# Patient Record
Sex: FEMALE | Race: WHITE | Employment: OTHER | URBAN - METROPOLITAN AREA
[De-identification: names, ages, dates, MRNs, and addresses within clinical notes are randomized per-mention and may not be internally consistent; named-entity substitution may affect disease eponyms.]

---

## 2017-01-13 PROBLEM — O99.820 GBS (GROUP B STREPTOCOCCUS CARRIER), +RV CULTURE, CURRENTLY PREGNANT: Status: ACTIVE | Noted: 2017-01-13

## 2017-01-27 NOTE — PROGRESS NOTES
Patient ID verified. Allergies, medical history, prenatal record and prior to admission medications verified. Pt instructed to be NPO after midnight. Pt instructed to call @ 0500 for time to arrive at hospital, come to entrance C and sign in at the registration desk on the 4th floor. Patient instructed to come to hospital sooner if SROM, labor, or concerning symptoms. Patient verbalized understanding. Questions encouraged and answered. Awaiting LI form to place orders.

## 2017-01-30 ENCOUNTER — ANESTHESIA (OUTPATIENT)
Dept: LABOR AND DELIVERY | Age: 32
End: 2017-01-30
Payer: SELF-PAY

## 2017-01-30 ENCOUNTER — HOSPITAL ENCOUNTER (INPATIENT)
Age: 32
LOS: 1 days | Discharge: HOME | End: 2017-01-31
Attending: OBSTETRICS & GYNECOLOGY | Admitting: OBSTETRICS & GYNECOLOGY
Payer: SELF-PAY

## 2017-01-30 ENCOUNTER — ANESTHESIA EVENT (OUTPATIENT)
Dept: LABOR AND DELIVERY | Age: 32
End: 2017-01-30
Payer: SELF-PAY

## 2017-01-30 PROBLEM — Z37.9 NORMAL LABOR: Status: ACTIVE | Noted: 2017-01-30

## 2017-01-30 PROBLEM — Z3A.39 39 WEEKS GESTATION OF PREGNANCY: Status: ACTIVE | Noted: 2017-01-30

## 2017-01-30 LAB
ABO + RH BLD: NORMAL
BASE DEFICIT BLDCOA-SCNC: 6.4 MMOL/L (ref 0–2)
BASE DEFICIT BLDCOV-SCNC: 3.9 MMOL/L (ref 1.9–7.7)
BDY SITE: ABNORMAL
BDY SITE: ABNORMAL
BLOOD GROUP ANTIBODIES SERPL: NORMAL
ERYTHROCYTE [DISTWIDTH] IN BLOOD BY AUTOMATED COUNT: 12.9 % (ref 11.9–14.6)
HCO3 BLDCOA-SCNC: 23 MMOL/L (ref 22–26)
HCO3 BLDV-SCNC: 23 MMOL/L
HCT VFR BLD AUTO: 37.8 % (ref 35.8–46.3)
HGB BLD-MCNC: 11.9 G/DL (ref 11.7–15.4)
MCH RBC QN AUTO: 28.3 PG (ref 26.1–32.9)
MCHC RBC AUTO-ENTMCNC: 31.5 G/DL (ref 31.4–35)
MCV RBC AUTO: 90 FL (ref 79.6–97.8)
PCO2 BLDCOA: 61 MMHG (ref 33–49)
PCO2 BLDCOV: 48 MMHG (ref 14.1–43.3)
PH BLDCOA: 7.19 [PH] (ref 7.21–7.31)
PH BLDCOV: 7.3 [PH] (ref 7.2–7.44)
PLATELET # BLD AUTO: 182 K/UL (ref 150–450)
PMV BLD AUTO: 11.6 FL (ref 10.8–14.1)
PO2 BLDCOA: 20 MMHG (ref 9–19)
PO2 BLDV: 24 MMHG (ref 30.4–57.2)
RBC # BLD AUTO: 4.2 M/UL (ref 4.05–5.25)
SAO2 % BLDCOA: 40 %
SAO2 % BLDV: 61 %
SERVICE CMNT-IMP: ABNORMAL
SERVICE CMNT-IMP: ABNORMAL
SPECIMEN EXP DATE BLD: NORMAL
WBC # BLD AUTO: 9.8 K/UL (ref 4.3–11.1)

## 2017-01-30 PROCEDURE — 74011250636 HC RX REV CODE- 250/636

## 2017-01-30 PROCEDURE — 82803 BLOOD GASES ANY COMBINATION: CPT

## 2017-01-30 PROCEDURE — 75410000000 HC DELIVERY VAGINAL/SINGLE

## 2017-01-30 PROCEDURE — 77030014125 HC TY EPDRL BBMI -B: Performed by: ANESTHESIOLOGY

## 2017-01-30 PROCEDURE — 75410000002 HC LABOR FEE PER 1 HR

## 2017-01-30 PROCEDURE — 74011000258 HC RX REV CODE- 258: Performed by: OBSTETRICS & GYNECOLOGY

## 2017-01-30 PROCEDURE — 0UQMXZZ REPAIR VULVA, EXTERNAL APPROACH: ICD-10-PCS | Performed by: OBSTETRICS & GYNECOLOGY

## 2017-01-30 PROCEDURE — 74011250636 HC RX REV CODE- 250/636: Performed by: OBSTETRICS & GYNECOLOGY

## 2017-01-30 PROCEDURE — 65270000029 HC RM PRIVATE

## 2017-01-30 PROCEDURE — A4300 CATH IMPL VASC ACCESS PORTAL: HCPCS | Performed by: ANESTHESIOLOGY

## 2017-01-30 PROCEDURE — 86900 BLOOD TYPING SEROLOGIC ABO: CPT | Performed by: OBSTETRICS & GYNECOLOGY

## 2017-01-30 PROCEDURE — 77030018846 HC SOL IRR STRL H20 ICUM -A

## 2017-01-30 PROCEDURE — 3E033VJ INTRODUCTION OF OTHER HORMONE INTO PERIPHERAL VEIN, PERCUTANEOUS APPROACH: ICD-10-PCS | Performed by: OBSTETRICS & GYNECOLOGY

## 2017-01-30 PROCEDURE — 77030011945 HC CATH URIN INT ST MENT -A

## 2017-01-30 PROCEDURE — 74011258636 HC RX REV CODE- 258/636: Performed by: OBSTETRICS & GYNECOLOGY

## 2017-01-30 PROCEDURE — 77030011943

## 2017-01-30 PROCEDURE — 10907ZC DRAINAGE OF AMNIOTIC FLUID, THERAPEUTIC FROM PRODUCTS OF CONCEPTION, VIA NATURAL OR ARTIFICIAL OPENING: ICD-10-PCS | Performed by: OBSTETRICS & GYNECOLOGY

## 2017-01-30 PROCEDURE — 74011250637 HC RX REV CODE- 250/637: Performed by: OBSTETRICS & GYNECOLOGY

## 2017-01-30 PROCEDURE — 4A1HXCZ MONITORING OF PRODUCTS OF CONCEPTION, CARDIAC RATE, EXTERNAL APPROACH: ICD-10-PCS | Performed by: OBSTETRICS & GYNECOLOGY

## 2017-01-30 PROCEDURE — 75410000003 HC RECOV DEL/VAG/CSECN EA 0.5 HR

## 2017-01-30 PROCEDURE — 85027 COMPLETE CBC AUTOMATED: CPT | Performed by: OBSTETRICS & GYNECOLOGY

## 2017-01-30 PROCEDURE — 76060000078 HC EPIDURAL ANESTHESIA

## 2017-01-30 PROCEDURE — 77030003028 HC SUT VCRL J&J -A

## 2017-01-30 RX ORDER — MINERAL OIL
120 OIL (ML) ORAL AS NEEDED
Status: DISCONTINUED | OUTPATIENT
Start: 2017-01-30 | End: 2017-01-30

## 2017-01-30 RX ORDER — BUTORPHANOL TARTRATE 1 MG/ML
1 INJECTION INTRAMUSCULAR; INTRAVENOUS
Status: DISCONTINUED | OUTPATIENT
Start: 2017-01-30 | End: 2017-01-30 | Stop reason: HOSPADM

## 2017-01-30 RX ORDER — HYDROCODONE BITARTRATE AND ACETAMINOPHEN 5; 325 MG/1; MG/1
1 TABLET ORAL
Status: DISCONTINUED | OUTPATIENT
Start: 2017-01-30 | End: 2017-01-31 | Stop reason: HOSPADM

## 2017-01-30 RX ORDER — IBUPROFEN 800 MG/1
800 TABLET ORAL
Status: DISCONTINUED | OUTPATIENT
Start: 2017-01-30 | End: 2017-01-31 | Stop reason: HOSPADM

## 2017-01-30 RX ORDER — CEFAZOLIN SODIUM IN 0.9 % NACL 2 G/50 ML
2 INTRAVENOUS SOLUTION, PIGGYBACK (ML) INTRAVENOUS ONCE
Status: COMPLETED | OUTPATIENT
Start: 2017-01-30 | End: 2017-01-30

## 2017-01-30 RX ORDER — SODIUM CHLORIDE 0.9 % (FLUSH) 0.9 %
5-10 SYRINGE (ML) INJECTION EVERY 8 HOURS
Status: DISCONTINUED | OUTPATIENT
Start: 2017-01-30 | End: 2017-01-30

## 2017-01-30 RX ORDER — ROPIVACAINE HYDROCHLORIDE 2 MG/ML
INJECTION, SOLUTION EPIDURAL; INFILTRATION; PERINEURAL
Status: DISCONTINUED | OUTPATIENT
Start: 2017-01-30 | End: 2017-01-30 | Stop reason: HOSPADM

## 2017-01-30 RX ORDER — MINERAL OIL
120 OIL (ML) ORAL
Status: COMPLETED | OUTPATIENT
Start: 2017-01-30 | End: 2017-01-30

## 2017-01-30 RX ORDER — LIDOCAINE HYDROCHLORIDE 20 MG/ML
JELLY TOPICAL
Status: DISCONTINUED | OUTPATIENT
Start: 2017-01-30 | End: 2017-01-30 | Stop reason: HOSPADM

## 2017-01-30 RX ORDER — OXYTOCIN/RINGER'S LACTATE 15/250 ML
250 PLASTIC BAG, INJECTION (ML) INTRAVENOUS ONCE
Status: DISCONTINUED | OUTPATIENT
Start: 2017-01-30 | End: 2017-01-30

## 2017-01-30 RX ORDER — DEXTROSE, SODIUM CHLORIDE, SODIUM LACTATE, POTASSIUM CHLORIDE, AND CALCIUM CHLORIDE 5; .6; .31; .03; .02 G/100ML; G/100ML; G/100ML; G/100ML; G/100ML
125 INJECTION, SOLUTION INTRAVENOUS CONTINUOUS
Status: DISCONTINUED | OUTPATIENT
Start: 2017-01-30 | End: 2017-01-30

## 2017-01-30 RX ORDER — OXYTOCIN/RINGER'S LACTATE 15/250 ML
250 PLASTIC BAG, INJECTION (ML) INTRAVENOUS ONCE
Status: ACTIVE | OUTPATIENT
Start: 2017-01-30 | End: 2017-01-31

## 2017-01-30 RX ORDER — DIPHENHYDRAMINE HCL 25 MG
25 CAPSULE ORAL
Status: DISCONTINUED | OUTPATIENT
Start: 2017-01-30 | End: 2017-01-31 | Stop reason: HOSPADM

## 2017-01-30 RX ORDER — SIMETHICONE 80 MG
80 TABLET,CHEWABLE ORAL
Status: DISCONTINUED | OUTPATIENT
Start: 2017-01-30 | End: 2017-01-31 | Stop reason: HOSPADM

## 2017-01-30 RX ORDER — ZOLPIDEM TARTRATE 5 MG/1
5 TABLET ORAL
Status: DISCONTINUED | OUTPATIENT
Start: 2017-01-30 | End: 2017-01-31 | Stop reason: HOSPADM

## 2017-01-30 RX ORDER — LIDOCAINE HYDROCHLORIDE 10 MG/ML
1 INJECTION INFILTRATION; PERINEURAL
Status: DISCONTINUED | OUTPATIENT
Start: 2017-01-30 | End: 2017-01-30 | Stop reason: HOSPADM

## 2017-01-30 RX ORDER — DIPHENHYDRAMINE HCL 25 MG
50 CAPSULE ORAL
Status: DISCONTINUED | OUTPATIENT
Start: 2017-01-30 | End: 2017-01-31 | Stop reason: HOSPADM

## 2017-01-30 RX ORDER — MINERAL OIL
OIL (ML) ORAL ONCE
Status: COMPLETED | OUTPATIENT
Start: 2017-01-30 | End: 2017-01-30

## 2017-01-30 RX ORDER — FENTANYL CITRATE 50 UG/ML
INJECTION, SOLUTION INTRAMUSCULAR; INTRAVENOUS AS NEEDED
Status: DISCONTINUED | OUTPATIENT
Start: 2017-01-30 | End: 2017-01-30 | Stop reason: HOSPADM

## 2017-01-30 RX ORDER — NALOXONE HYDROCHLORIDE 0.4 MG/ML
0.4 INJECTION, SOLUTION INTRAMUSCULAR; INTRAVENOUS; SUBCUTANEOUS AS NEEDED
Status: DISCONTINUED | OUTPATIENT
Start: 2017-01-30 | End: 2017-01-31 | Stop reason: HOSPADM

## 2017-01-30 RX ORDER — HYDROCODONE BITARTRATE AND ACETAMINOPHEN 5; 325 MG/1; MG/1
2 TABLET ORAL
Status: DISCONTINUED | OUTPATIENT
Start: 2017-01-30 | End: 2017-01-31 | Stop reason: HOSPADM

## 2017-01-30 RX ORDER — SODIUM CHLORIDE 0.9 % (FLUSH) 0.9 %
5-10 SYRINGE (ML) INJECTION AS NEEDED
Status: DISCONTINUED | OUTPATIENT
Start: 2017-01-30 | End: 2017-01-30

## 2017-01-30 RX ORDER — OXYTOCIN/RINGER'S LACTATE 30/500 ML
.5-2 PLASTIC BAG, INJECTION (ML) INTRAVENOUS
Status: DISCONTINUED | OUTPATIENT
Start: 2017-01-30 | End: 2017-01-30 | Stop reason: HOSPADM

## 2017-01-30 RX ADMIN — WITCH HAZEL 1 PAD: 500 SOLUTION RECTAL; TOPICAL at 20:35

## 2017-01-30 RX ADMIN — CEFAZOLIN 2 G: 1 INJECTION, POWDER, FOR SOLUTION INTRAMUSCULAR; INTRAVENOUS; PARENTERAL at 07:45

## 2017-01-30 RX ADMIN — SODIUM CHLORIDE, SODIUM LACTATE, POTASSIUM CHLORIDE, CALCIUM CHLORIDE, AND DEXTROSE MONOHYDRATE 125 ML/HR: 600; 310; 30; 20; 5 INJECTION, SOLUTION INTRAVENOUS at 07:30

## 2017-01-30 RX ADMIN — MINERAL OIL 120 ML: 471.95 OIL ORAL at 15:55

## 2017-01-30 RX ADMIN — ROPIVACAINE HYDROCHLORIDE 10 ML/HR: 2 INJECTION, SOLUTION EPIDURAL; INFILTRATION; PERINEURAL at 12:03

## 2017-01-30 RX ADMIN — SODIUM CHLORIDE, SODIUM LACTATE, POTASSIUM CHLORIDE, AND CALCIUM CHLORIDE 1000 ML: 600; 310; 30; 20 INJECTION, SOLUTION INTRAVENOUS at 11:29

## 2017-01-30 RX ADMIN — OXYTOCIN 2 MILLI-UNITS/MIN: 10 INJECTION, SOLUTION INTRAMUSCULAR; INTRAVENOUS at 07:50

## 2017-01-30 RX ADMIN — FENTANYL CITRATE 100 MCG: 50 INJECTION, SOLUTION INTRAMUSCULAR; INTRAVENOUS at 15:55

## 2017-01-30 RX ADMIN — BENZOCAINE AND MENTHOL 1 SPRAY: 20; .5 SPRAY TOPICAL at 20:35

## 2017-01-30 RX ADMIN — MINERAL OIL: 471.95 OIL ORAL at 16:50

## 2017-01-30 RX ADMIN — SODIUM CHLORIDE 1 G: 900 INJECTION, SOLUTION INTRAVENOUS at 15:25

## 2017-01-30 RX ADMIN — MINERAL OIL 120 ML: 471.95 OIL ORAL at 17:07

## 2017-01-30 NOTE — PROGRESS NOTES
Patient starting to feel relief from epidural, feeling a little pain in right lower abdomen. Repositioned to right tilt, will continue to monitor    Dr Maura Brownlee called unit for update, patient has epidural, AROM @ 1040 SVE at that time 4cm.   Orders received to call her when patient is closer to delivery

## 2017-01-30 NOTE — IP AVS SNAPSHOT
Current Discharge Medication List  
  
Take these medications as needed Dose & Instructions Dispensing Information Comments Morning Noon Evening Bedtime  
 benzocaine-menthol 20-0.5 % Aero Commonly known as:  DERMOPLAST Your next dose is: Today, Tomorrow Other:  ____________ Dose:  1 Spray Apply 1 Spray to affected area as needed for Pain. Quantity:  1 mL Refills:  3 HYDROcodone-acetaminophen 5-325 mg per tablet Commonly known as:  Viva Ignacio Your next dose is: Today, Tomorrow Other:  ____________ Dose:  1-2 Tab Take 1-2 Tabs by mouth every six (6) hours as needed. Max Daily Amount: 8 Tabs. Quantity:  30 Tab Refills:  0  
     
   
   
   
  
 ibuprofen 800 mg tablet Commonly known as:  MOTRIN Your next dose is: Today, Tomorrow Other:  ____________ Dose:  800 mg Take 1 Tab by mouth every eight (8) hours as needed. Quantity:  90 Tab Refills:  0 Take these medications as directed Dose & Instructions Dispensing Information Comments Morning Noon Evening Bedtime  
 prenatal no. 40-iron-FA-dha 27mg iron- 800 mcg-250 mg Cap Your next dose is: Today, Tomorrow Other:  ____________ Take  by mouth. OTC Gummy Refills:  0 Where to Get Your Medications These medications were sent to [address] [place] [address]  [address] [place] [address] Phone:  [phone]  
  benzocaine-menthol 20-0.5 % Aero  
 ibuprofen 800 mg tablet Information about where to get these medications is not yet available ! Ask your nurse or doctor about these medications HYDROcodone-acetaminophen 5-325 mg per tablet

## 2017-01-30 NOTE — PROGRESS NOTES
01/30/17 1040   Membranes   Membrane Status AROM   Rupture Date 01/30/17   Rupture Time 1040   Amniotic Fluid Description Clear   Amniotic Fluid Volume  Moderate   Cervical Exam   Dilation (cm) 4  (per Dr tidwell)   Dr Frank Fox at , strip reviewed.   Patient may have epidural when desires

## 2017-01-30 NOTE — L&D DELIVERY NOTE
Delivery Summary    Patient: Dale Zepeda MRN: [id number]  SSN: [id number]    YOB: 1985  Age: 33 y.o. Sex: female       Information for the patient's :  Paula Gallagher [[id number]]       Labor Events:    Labor: No   Rupture Date:     Rupture Time:     Rupture Type AROM   Amniotic Fluid Volume: Moderate    Amniotic Fluid Description: Clear     Induction: AROM; Oxytocin       Augmentation: None   Labor Events: None     Cervical Ripening:     None     Delivery Events:  Episiotomy: Midline   Laceration(s): Left periurethral     Repaired: Yes    Number of Repair Packets: 1   Suture Type and Size: Vicryl 3-0     Estimated Blood Loss (ml): 350ml       Delivery Date: 2017    Delivery Time: 5:29 PM  Delivery Type: Vaginal, Spontaneous Delivery  Sex:  Female     Gestational Age: 36w3d   Delivery Clinician:  Lyudmila Case  Living Status: Yes   Delivery Location: L&D            APGARS  One minute Five minutes Ten minutes   Skin color: 0   1        Heart rate: 2   2        Grimace: 1   2        Muscle tone: 2   2        Breathin   2        Totals: 7   9            Presentation: Vertex    Position: Left Occiput Anterior  Resuscitation Method:  Tactile Stimulation;Suctioning-bulb     Meconium Stained: None      Cord Vessels: 3 Vessels      Cord Events:    Cord Blood Sent?:  Yes    Blood Gases Sent?:  Yes    Placenta:  Date/Time:   5:34 PM  Removal: Spontaneous      Appearance: Normal;Intact     [place] Measurements:  Birth Weight: 7 lb 13.9 oz (3.57 kg)      Birth Length: 56 cm      Head Circumference: 33 cm      Chest Circumference: 34 cm     Abdominal Girth:       Other Providers:   Chang HARLEY;BREANA JOHNSON;VIJI COLEMAN;DISHA HOGAN, Obstetrician;Primary Nurse;Primary [place] Nurse;Charge Nurse           Group B Strep:   Lab Results   Component Value Date/Time    GrBStrep, External Positive 2017     Information for the patient's :  Brie Pia Hanh [[id number]]   No results found for: ABORH, PCTABR, PCTDIG, BILI, ABORHEXT, ABORH    Lab Results   Component Value Date/Time    APH 7.192 (L) 01/30/2017 05:29 PM    APCO2 61 (H) 01/30/2017 05:29 PM    APO2 20 (H) 01/30/2017 05:29 PM    AHCO3 23 01/30/2017 05:29 PM    ABDC 6.4 (H) 01/30/2017 05:29 PM    EPHV 7.298 01/30/2017 05:29 PM    PCO2V 48 (H) 01/30/2017 05:29 PM    PO2V 24 (L) 01/30/2017 05:29 PM    HCO3V 23 01/30/2017 05:29 PM    EBDV 3.9 01/30/2017 05:29 PM    SITE CORD 01/30/2017 05:29 PM    SITE CORD 01/30/2017 05:29 PM    RSCOM cc at 1 30 2017 5 42 40 PM. Not read back. 01/30/2017 05:29 PM    RSCOM cc at 1 30 2017 5 45 11 PM. Not read back. 01/30/2017 05:29 PM      Delivery Note        Lab Results   Component Value Date/Time    PH,CORD BLD ARTERIAL 7.192 01/30/2017 05:29 PM    PCO2,CORD BLD ARTERIAL 61 01/30/2017 05:29 PM    PO2,CORD BLD ARTERIAL 20 01/30/2017 05:29 PM    HCO3,CORD BLD ARTERIAL 23 01/30/2017 05:29 PM    BASE DEFICIT,CBA 6.4 01/30/2017 05:29 PM    SITE CORD 01/30/2017 05:29 PM    SITE CORD 01/30/2017 05:29 PM    Respiratory comment: cc at 1 30 2017 5 42 40 PM. Not read back. 01/30/2017 05:29 PM    Respiratory comment: cc at 1 30 2017 5 45 11 PM. Not read back. 01/30/2017 05:29 PM            Lab Results   Component Value Date/Time    Rubella, External Non-Immune 07/20/2016    GrBStrep, External Positive 01/09/2017            Procedure:  Patient became completely dilated and pushed. Episiotomy was performed because she couldn't push past 4+ station for over 30 minutes. Patient delivered head. Mouth and nares suctioned on the perineum with bulb syringe. Posterior shoulder delivered first since it was a hand shoulder presentation. Baby delivered in the bed, was dried. The cord was clamped and cut. Cord pH and cord blood was obtained. The baby was placed on mom in a dry blanket or towel. The perineum was examined. The MLE did not extend and was repaired with 3.0 vicryl. Emelyn Krishnamurthyuss She also had a small left periurethral that did not require a suture. The placenta was delivered spontaneously. It was examined. It was intact with three vessels. Mom and baby are doing well.          Penelope Butler MD  1/30/2017  6:07 PM

## 2017-01-30 NOTE — H&P
History & Physical    Name: Dilia Morrow MRN: [id number]  SSN: [id number]    YOB: 1985  Age: 33 y.o. Sex: female      Subjective:     Estimated Date of Delivery: 17  OB History    Para Term  AB SAB TAB Ectopic Multiple Living   2 1 1       1      # Outcome Date GA Lbr Silver/2nd Weight Sex Delivery Anes PTL Lv   2 Current            1 Term 11 40w0d  8 lb 0.8 oz (3.65 kg) babbel       Ms. Ruslan Patel is admitted with pregnancy at 39w1d for induction of labor with Dr. Shruti Urrutia due to favorable cervix at term in a multigravida, GBS+. Prenatal course was normal.  Please see prenatal records for details. Past Medical History   Diagnosis Date    Abnormal Papanicolaou smear of cervix      HPV negative    Hx of postpartum depression, currently pregnant      no meds    Postpartum depression      Past Surgical History   Procedure Laterality Date    Hx breast biopsy Left 2011     papilloma - 3:00; Dr. Shashi Silver Hx knee arthroscopy Left     Hx breast biopsy Left 2012     needle biopsy    Hx skin biopsy Left      removal of dermatofibroma left inner thigh    Hx wisdom teeth extraction       Social History     Occupational History    Interior Design Self Employed     Social History Main Topics    Smoking status: Never Smoker    Smokeless tobacco: Never Used    Alcohol use No    Drug use: No    Sexual activity: Yes     Partners: Male     Birth control/ protection: None     Family History   Problem Relation Age of Onset    Hypertension Paternal Grandmother     Heart Disease Paternal Grandmother     Cancer Maternal Grandmother [de-identified]     Lung       Allergies   Allergen Reactions    Gluten Diarrhea     Achy, digestive issues, acne    Peanut Swelling    Penicillin G Hives     Prior to Admission medications    Medication Sig Start Date End Date Taking? Authorizing Provider   prenatal no. 40-iron-FA-dha 27mg iron- 800 mcg-250 mg cap Take  by mouth. OTC Gummy   Yes Historical Provider        Review of Systems: A comprehensive review of systems was negative except for that written in the History of Present Illness. Objective:     Vitals:  Vitals:    01/30/17 0923 01/30/17 0952 01/30/17 1023 01/30/17 1052   BP: 100/62 106/67 100/58 104/69   Pulse: 79 83 75 88   Resp:       Temp:       Weight:       Height:            Physical Exam:  Patient without distress. Heart: Regular rate  Lung: normal respiratory effort  Abdomen: soft, nontender, gravid  Fundus: soft and non tender  Perineum: blood absent, amniotic fluid absent  Cervical Exam: 4 cm dilated    Lower Extremities:  - Edema No  Membranes:  Artificial Rupture of Membranes; Amniotic Fluid: small amount of clear fluid  Fetal Heart Rate: Reactive  Variability: moderate  Accelerations: yes  Decelerations: none  Uterine contractions: q 2-3 min on toco          Prenatal Labs:   Lab Results   Component Value Date/Time    ABO/Rh(D) O POSITIVE 01/30/2017 07:30 AM    Rubella, External Non-Immune 07/20/2016    HBsAg, External Negative 07/20/2016    HIV, External Non-Reactive  07/20/2016    RPR, External Non-Reactive  07/20/2016    Gonorrhea, External Negative 08/01/2016    Chlamydia, External Negative  08/01/2016    ABO,Rh O Positive  07/20/2016    GrBStrep, External Positive 01/09/2017       Impression/Plan:     Principal Problem:    Normal labor (1/30/2017)    Active Problems:    Supervision of normal intrauterine pregnancy in multigravida (10/17/2016)      Overview: Cara Meredith            Problems:            1. Prior VAVD (2011) with midline episiotomy / partial 3rd degree per       ELVA's delivery note. Pt prefers IOL at 39-40wks due to this and states       this was ELVA's recommendation to her after the delivery. Birthweight was       3650 grams which is 52%ile. She pushed for 2 hours and was +2 station.       2.  GBS + -- treat in labor with Ancef      GBS (group B Streptococcus carrier), +RV culture, currently pregnant (1/13/2017)      39 weeks gestation of pregnancy (1/30/2017)         Plan: Admit for induction of labor. Group B Strep positive, will treat prophylactically with Ancef due to non-anaphylactic reaction to PCN. Dr. Akhil Mcdonough would like to be notified for delivery if possible.      Signed By:  Pilar Blair MD     January 30, 2017

## 2017-01-30 NOTE — IP AVS SNAPSHOT
Irma Idol 
 
 
 [address] [id number] [facility] 
[phone] Patient: Maribell Fenton MRN: [id number] RSO:[id number] You are allergic to the following Allergen Reactions Gluten Diarrhea Achy, digestive issues, acne Peanut Swelling Penicillin G Hives Immunizations Administered for This Admission Name Date MMR 1/31/2017 Recent Documentation Height Weight Breastfeeding? BMI OB Status Smoking Status 1.727 m 72.1 kg Yes 24.18 kg/m2 Recent pregnancy Never Smoker Emergency Contacts Name Discharge Info Relation Home Work Mobile Ronald Watson  Spouse [3] [phone] About your hospitalization You were admitted on:  January 30, 2017 You last received care in the:  [address] [facility] You were discharged on:  January 31, 2017 Unit phone number:  [phone] Why you were hospitalized Your primary diagnosis was:  Normal Labor Your diagnoses also included:  39 Weeks Gestation Of Pregnancy, Gbs (Group B Streptococcus Carrier), +Rv Culture, Currently Pregnant, Supervision Of Normal Intrauterine Pregnancy In Multigravida Providers Seen During Your Hospitalizations Provider Role Specialty Primary office phone Indra Williamson MD Attending Provider Obstetrics & Gynecology [phone] Alyssa Puente MD Attending Provider Obstetrics & Gynecology [phone] Your Primary Care Physician (PCP) Primary Care Physician Office Phone Office Fax UNKNOWN, PROVIDER ** None ** ** None ** Follow-up Information Follow up With Details Comments Contact Info Provider Unknown   Patient not available to ask Zehra Zafar MD In 6 weeks  [address] [address] [phone] Current Discharge Medication List  
  
START taking these medications Dose & Instructions Dispensing Information Comments Morning Noon Evening Bedtime  
 benzocaine-menthol 20-0.5 % Aero Commonly known as:  DERMOPLAST Your next dose is: Today, Tomorrow Other:  _________ Dose:  1 Spray Apply 1 Spray to affected area as needed for Pain. Quantity:  1 mL Refills:  3 HYDROcodone-acetaminophen 5-325 mg per tablet Commonly known as:  Juanetta Rasp Your next dose is: Today, Tomorrow Other:  _________ Dose:  1-2 Tab Take 1-2 Tabs by mouth every six (6) hours as needed. Max Daily Amount: 8 Tabs. Quantity:  30 Tab Refills:  0  
     
   
   
   
  
 ibuprofen 800 mg tablet Commonly known as:  MOTRIN Your next dose is: Today, Tomorrow Other:  _________ Dose:  800 mg Take 1 Tab by mouth every eight (8) hours as needed. Quantity:  90 Tab Refills:  0 CONTINUE these medications which have NOT CHANGED Dose & Instructions Dispensing Information Comments Morning Noon Evening Bedtime  
 prenatal no. 40-iron-FA-dha 27mg iron- 800 mcg-250 mg Cap Your next dose is: Today, Tomorrow Other:  _________ Take  by mouth. OTC Gummy Refills:  0 Where to Get Your Medications These medications were sent to [place] [address] Phone:  [phone]  
  benzocaine-menthol 20-0.5 % Aero  
 ibuprofen 800 mg tablet Information on where to get these meds will be given to you by the nurse or doctor. ! Ask your nurse or doctor about these medications HYDROcodone-acetaminophen 5-325 mg per tablet Discharge Instructions Vaginal Childbirth: Care Instructions Your Care Instructions Your body will slowly heal in the next few weeks. It is easy to get too tired and overwhelmed during the first weeks after your baby is born. Changes in your hormones can shift your mood without warning. You may find it hard to meet the extra demands on your energy and time. Take it easy on yourself. Follow-up care is a key part of your treatment and safety. Be sure to make and go to all appointments, and call your doctor if you are having problems. It's also a good idea to know your test results and keep a list of the medicines you take. How can you care for yourself at home? · Vaginal bleeding and cramps ¨ After delivery, you will have a bloody discharge from the vagina. This will turn pink within a week and then white or yellow after about 10 days. It may last for 2 to 4 weeks or longer, until the uterus has healed. Use pads instead of tampons until you stop bleeding. ¨ Do not worry if you pass some blood clots, as long as they are smaller than a golf ball. If you have a tear or stitches in your vaginal area, change the pad at least every 4 hours to prevent soreness and infection. ¨ You may have cramps for the first few days after childbirth. These are normal and occur as the uterus shrinks to normal size. Take an over-the-counter pain medicine, such as acetaminophen (Tylenol), ibuprofen (Advil, Motrin), or naproxen (Aleve), for cramps. Read and follow all instructions on the label. Do not take aspirin, because it can cause more bleeding. ¨ Do not take two or more pain medicines at the same time unless the doctor told you to. Many pain medicines have acetaminophen, which is Tylenol. Too much acetaminophen (Tylenol) can be harmful. · Stitches ¨ If you have stitches, they will dissolve on their own and do not need to be removed. Follow your doctor's instructions for cleaning the stitched area. ¨ Put ice or a cold pack on your painful area for 10 to 20 minutes at a time, several times a day, for the first few days. Put a thin cloth between the ice and your skin.  
¨ Sit in a few inches of warm water (sitz bath) 3 times a day and after bowel movements. The warm water helps with pain and itching. If you do not have a tub, a warm shower might help. · Breast fullness ¨ Your breasts may overfill (engorge) in the first few days after delivery. To help milk flow and to relieve pain, warm your breasts in the shower or by using warm, moist towels before nursing. ¨ If you are not nursing, do not put warmth on your breasts or touch your breasts. Wear a tight bra or sports bra and use ice until the fullness goes away. This usually takes 2 to 3 days. ¨ Put ice or a cold pack on your breast after nursing to reduce swelling and pain. Put a thin cloth between the ice and your skin. · Activity ¨ Eat a balanced diet. Do not try to lose weight by cutting calories. Keep taking your prenatal vitamins, or take a multivitamin. ¨ Get as much rest as you can. Try to take naps when your baby sleeps during the day. ¨ Get some exercise every day. But do not do any heavy exercise until your doctor says it is okay. ¨ Wait until you are healed (about 4 to 6 weeks) before you have sexual intercourse. Your doctor will tell you when it is okay to have sex. ¨ Talk to your doctor about birth control. You can get pregnant even before your period returns. Also, you can get pregnant while you are breastfeeding. · Mental health ¨ It is normal to have some sadness, anxiety, sleeplessness, and mood swings after you go home. If you feel upset or hopeless for more than a few days or are having trouble doing the things you need to do, talk to your doctor. · Constipation and hemorrhoids ¨ Drink plenty of fluids, enough so that your urine is light yellow or clear like water. If you have kidney, heart, or liver disease and have to limit fluids, talk with your doctor before you increase the amount of fluids you drink. ¨ Eat plenty of fiber each day. Have a bran muffin or bran cereal for breakfast, and try eating a piece of fruit for a mid-afternoon snack. ¨ For painful, itchy hemorrhoids, put ice or a cold pack on the area several times a day for 10 minutes at a time. Follow this by putting a warm compress on the area for another 10 to 20 minutes or by sitting in a shallow, warm bath. When should you call for help? Call 911 anytime you think you may need emergency care. For example, call if: 
· You are thinking of hurting yourself, your baby, or anyone else. · You have sudden, severe pain in your belly. · You passed out (lost consciousness). Call your doctor now or seek immediate medical care if: 
· You have severe vaginal bleeding. · You are soaking through a pad each hour for 2 or more hours. · Your vaginal bleeding seems to be getting heavier or is still bright red 4 days after delivery. · You are dizzy or lightheaded, or you feel like you may faint. · You are vomiting or cannot keep fluids down. · You have a fever. · You have new or more belly pain. · You pass tissue (not just blood). · Your vaginal discharge smells bad. · Your belly feels tender or full and hard. · Your breasts are continuously painful or red. · You feel sad, anxious, or hopeless for more than a few days. Watch closely for changes in your health, and be sure to contact your doctor if you have any problems. Where can you learn more? Go to http://blaine-rena.info/. Enter J209 in the search box to learn more about \"Vaginal Childbirth: Care Instructions. \" Current as of: May 30, 2016 Content Version: 11.1 © 5878-5382 [employer]. Care instructions adapted under license by [employer] (which disclaims liability or warranty for this information). If you have questions about a medical condition or this instruction, always ask your healthcare professional. [place] [address] any warranty or liability for your use of this information. Discharge Orders None [facility] Announcement We are excited to announce that we are making your provider's discharge notes available to you in [employer]. You will see these notes when they are completed and signed by the physician that discharged you from your recent hospital stay. If you have any questions or concerns about any information you see in [employer], please call the Health Information Department where you were seen or reach out to your Primary Care Provider for more information about your plan of care. Introducing [facility]! [employer] introduces [employer] patient portal. Now you can access parts of your medical record, email your doctor's office, and request medication refills online. 1. In your internet browser, go to [email or web address] 2. Click on the First Time User? Click Here link in the Sign In box. You will see the New Member Sign Up page. 3. Enter your [employer] Access Code exactly as it appears below. You will not need to use this code after youve completed the sign-up process. If you do not sign up before the expiration date, you must request a new code. · [employer] Access Code: [facility] Expires: 4/13/2017 10:07 AM 
 
4. Enter the last four digits of your Social Security Number (xxxx) and Date of Birth (mm/dd/yyyy) as indicated and click Submit. You will be taken to the next sign-up page. 5. Create a [employer] ID. This will be your [employer] login ID and cannot be changed, so think of one that is secure and easy to remember. 6. Create a [employer] password. You can change your password at any time. 7. Enter your Password Reset Question and Answer. This can be used at a later time if you forget your password. 8. Enter your e-mail address. You will receive e-mail notification when new information is available in [address] 9. Click Sign Up. You can now view and download portions of your medical record.  
10. Click the Download Summary menu link to download a portable copy of your medical information. If you have questions, please visit the Frequently Asked Questions section of the MyChart website. Remember, MyChart is NOT to be used for urgent needs. For medical emergencies, dial 911. Now available from your iPhone and Android! General Information Please provide this summary of care documentation to your next provider. Patient Signature:  ____________________________________________________________ Date:  ____________________________________________________________  
  
Charlotte Stevens Provider Signature:  ____________________________________________________________ Date:  ____________________________________________________________

## 2017-01-30 NOTE — PROGRESS NOTES
01/30/17 1305   Straight Cath   Straight Cath Nurse performed cath   Number of Attempts 1   Catheter Size 16 FR   Time Catheter Inserted 2862   Time Catheter Removed 1308   Urine 450 mL   Urine Assessment   Urinary Status Straight cath   Urine Color Yellow/straw   Urine Appearance Clear   Place on peanut on right side

## 2017-01-30 NOTE — PROGRESS NOTES
Pt admitted to Room 433 for scheduled induction. Admission assessment completed. Discussed plan of care with patient. Discussed pt's choice of infant feeding method. Feeding options explored, including the importance of exclusive breastfeeding. Pt informed of our breastfeeding support system from from nursing staff and lactation staff during inpatient stay and following discharge. Questions and concerns addressed at this time. Pt confirms breastfeeding is her decision at this time.

## 2017-01-30 NOTE — PROGRESS NOTES
Ultrasound adjusted, patient continues to feel contractions in right lower abdomen.   Remains in right tilt

## 2017-01-30 NOTE — PROGRESS NOTES
Olivia Araiza at bedside at 1140     Assisted pt to sitting up on bedside at 1141. Timeout completed at [phone] with MD, GUNNAR and myself at bedside. Test dose given [id number] . Negative reaction. See anesthesia record for details. See vital sign flow sheet for BP. Tolerated procedure well.

## 2017-01-30 NOTE — PROGRESS NOTES
01/30/17 0741   Peripheral IV 01/30/17 Left Wrist   Placement Date/Time: 01/30/17 0730   Number of Attempts: 1  Inserted By: Pete Anaya  Present on Admission/Arrival: No  Size: 18 G  Orientation: Left  Location: Wrist   Site Assessment Clean, dry, & intact   Phlebitis Assessment 0   Infiltration Assessment 0   Dressing Status Clean, dry, & intact   Dressing Type Tape;Transparent   Hub Color/Line Status Green; Infusing   Action Taken Blood drawn

## 2017-01-30 NOTE — PROGRESS NOTES
01/30/17 1550   Cervical Exam   Dilation (cm) 10   Eff 100 %   Station +1   Dr Rasheed Torres called, MD to see two more patient's in office and will head this way    Patient continues to have pain in right lower abdomen, CRNA back to bs for more dosing

## 2017-01-30 NOTE — PROGRESS NOTES
Delivery Note      Pt positioned for delivery and set up at 1640. Spontaneous vaginal delivery of viable female infant @ 0435    [id number] 7 7. Perineum with second degree episiotomy and repaired. Placenta delivered @ [phone]    See delivery summary for details.

## 2017-01-30 NOTE — ANESTHESIA PREPROCEDURE EVALUATION
Anesthetic History               Review of Systems / Medical History  Patient summary reviewed and pertinent labs reviewed    Pulmonary                   Neuro/Psych              Cardiovascular                  Exercise tolerance: >4 METS     GI/Hepatic/Renal                Endo/Other             Other Findings              Physical Exam    Airway  Mallampati: I  TM Distance: > 6 cm  Neck ROM: normal range of motion   Mouth opening: Normal     Cardiovascular  Regular rate and rhythm,  S1 and S2 normal,  no murmur, click, rub, or gallop  Rhythm: regular  Rate: normal         Dental  No notable dental hx       Pulmonary  Breath sounds clear to auscultation               Abdominal         Other Findings            Anesthetic Plan    ASA: 1  Anesthesia type: epidural            Anesthetic plan and risks discussed with: Patient

## 2017-01-30 NOTE — PROGRESS NOTES
01/30/17 1505   Straight Cath   Straight Cath Nurse performed cath   Number of Attempts 1   Catheter Size 16 FR   Time Catheter Inserted 1505   Time Catheter Removed 1507   Urine 400 mL   Urine Assessment   Urinary Status Straight cath   Urine Color Yellow/straw   Urine Appearance Clear   Patient with c/o pain 4/10 in right lower abdomen. Repositioned to right side on peanut, epidural button dosed per patient.       Dr Jose Agosto notified of SVE, call MD when patient is 10 cm

## 2017-01-31 VITALS
TEMPERATURE: 97.9 F | DIASTOLIC BLOOD PRESSURE: 56 MMHG | BODY MASS INDEX: 24.1 KG/M2 | WEIGHT: 159 LBS | HEART RATE: 96 BPM | RESPIRATION RATE: 16 BRPM | HEIGHT: 68 IN | SYSTOLIC BLOOD PRESSURE: 106 MMHG

## 2017-01-31 PROCEDURE — 74011250636 HC RX REV CODE- 250/636: Performed by: OBSTETRICS & GYNECOLOGY

## 2017-01-31 PROCEDURE — 90707 MMR VACCINE SC: CPT | Performed by: OBSTETRICS & GYNECOLOGY

## 2017-01-31 RX ORDER — IBUPROFEN 800 MG/1
800 TABLET ORAL
Qty: 90 TAB | Refills: 0 | Status: SHIPPED | OUTPATIENT
Start: 2017-01-31 | End: 2017-03-16

## 2017-01-31 RX ORDER — HYDROCODONE BITARTRATE AND ACETAMINOPHEN 5; 325 MG/1; MG/1
1-2 TABLET ORAL
Qty: 30 TAB | Refills: 0 | Status: SHIPPED | OUTPATIENT
Start: 2017-01-31 | End: 2017-03-16

## 2017-01-31 RX ADMIN — MEASLES, MUMPS, AND RUBELLA VIRUS VACCINE LIVE 0.5 ML: 1000; 12500; 1000 INJECTION, POWDER, LYOPHILIZED, FOR SUSPENSION SUBCUTANEOUS at 17:30

## 2017-01-31 NOTE — DISCHARGE INSTRUCTIONS
Vaginal Childbirth: Care Instructions  Your Care Instructions  Your body will slowly heal in the next few weeks. It is easy to get too tired and overwhelmed during the first weeks after your baby is born. Changes in your hormones can shift your mood without warning. You may find it hard to meet the extra demands on your energy and time. Take it easy on yourself. Follow-up care is a key part of your treatment and safety. Be sure to make and go to all appointments, and call your doctor if you are having problems. It's also a good idea to know your test results and keep a list of the medicines you take. How can you care for yourself at home? · Vaginal bleeding and cramps  ¨ After delivery, you will have a bloody discharge from the vagina. This will turn pink within a week and then white or yellow after about 10 days. It may last for 2 to 4 weeks or longer, until the uterus has healed. Use pads instead of tampons until you stop bleeding. ¨ Do not worry if you pass some blood clots, as long as they are smaller than a golf ball. If you have a tear or stitches in your vaginal area, change the pad at least every 4 hours to prevent soreness and infection. ¨ You may have cramps for the first few days after childbirth. These are normal and occur as the uterus shrinks to normal size. Take an over-the-counter pain medicine, such as acetaminophen (Tylenol), ibuprofen (Advil, Motrin), or naproxen (Aleve), for cramps. Read and follow all instructions on the label. Do not take aspirin, because it can cause more bleeding. ¨ Do not take two or more pain medicines at the same time unless the doctor told you to. Many pain medicines have acetaminophen, which is Tylenol. Too much acetaminophen (Tylenol) can be harmful. · Stitches  ¨ If you have stitches, they will dissolve on their own and do not need to be removed. Follow your doctor's instructions for cleaning the stitched area.   ¨ Put ice or a cold pack on your painful area for 10 to 20 minutes at a time, several times a day, for the first few days. Put a thin cloth between the ice and your skin. ¨ Sit in a few inches of warm water (sitz bath) 3 times a day and after bowel movements. The warm water helps with pain and itching. If you do not have a tub, a warm shower might help. · Breast fullness  ¨ Your breasts may overfill (engorge) in the first few days after delivery. To help milk flow and to relieve pain, warm your breasts in the shower or by using warm, moist towels before nursing. ¨ If you are not nursing, do not put warmth on your breasts or touch your breasts. Wear a tight bra or sports bra and use ice until the fullness goes away. This usually takes 2 to 3 days. ¨ Put ice or a cold pack on your breast after nursing to reduce swelling and pain. Put a thin cloth between the ice and your skin. · Activity  ¨ Eat a balanced diet. Do not try to lose weight by cutting calories. Keep taking your prenatal vitamins, or take a multivitamin. ¨ Get as much rest as you can. Try to take naps when your baby sleeps during the day. ¨ Get some exercise every day. But do not do any heavy exercise until your doctor says it is okay. ¨ Wait until you are healed (about 4 to 6 weeks) before you have sexual intercourse. Your doctor will tell you when it is okay to have sex. ¨ Talk to your doctor about birth control. You can get pregnant even before your period returns. Also, you can get pregnant while you are breastfeeding. · Mental health  ¨ It is normal to have some sadness, anxiety, sleeplessness, and mood swings after you go home. If you feel upset or hopeless for more than a few days or are having trouble doing the things you need to do, talk to your doctor. · Constipation and hemorrhoids  ¨ Drink plenty of fluids, enough so that your urine is light yellow or clear like water.  If you have kidney, heart, or liver disease and have to limit fluids, talk with your doctor before you increase the amount of fluids you drink. ¨ Eat plenty of fiber each day. Have a bran muffin or bran cereal for breakfast, and try eating a piece of fruit for a mid-afternoon snack. ¨ For painful, itchy hemorrhoids, put ice or a cold pack on the area several times a day for 10 minutes at a time. Follow this by putting a warm compress on the area for another 10 to 20 minutes or by sitting in a shallow, warm bath. When should you call for help? Call 911 anytime you think you may need emergency care. For example, call if:  · You are thinking of hurting yourself, your baby, or anyone else. · You have sudden, severe pain in your belly. · You passed out (lost consciousness). Call your doctor now or seek immediate medical care if:  · You have severe vaginal bleeding. · You are soaking through a pad each hour for 2 or more hours. · Your vaginal bleeding seems to be getting heavier or is still bright red 4 days after delivery. · You are dizzy or lightheaded, or you feel like you may faint. · You are vomiting or cannot keep fluids down. · You have a fever. · You have new or more belly pain. · You pass tissue (not just blood). · Your vaginal discharge smells bad. · Your belly feels tender or full and hard. · Your breasts are continuously painful or red. · You feel sad, anxious, or hopeless for more than a few days. Watch closely for changes in your health, and be sure to contact your doctor if you have any problems. Where can you learn more? Go to http://blaine-rena.info/. Enter T904 in the search box to learn more about \"Vaginal Childbirth: Care Instructions. \"  Current as of: May 30, 2016  Content Version: 11.1  © 3295-1462 [employer]. Care instructions adapted under license by [employer] (which disclaims liability or warranty for this information).  If you have questions about a medical condition or this instruction, always ask your healthcare professional. [employer] disclaims any warranty or liability for your use of this information.

## 2017-01-31 NOTE — LACTATION NOTE

## 2017-01-31 NOTE — PROGRESS NOTES
SBAR OUT Report: Mother    Verbal report given to Omid Lai RN on this patient, who is now being transferred to [facility]U for routine progression of care. The patient is not wearing a green \"Anesthesia-Duramorph\" band. Report consisted of patient's Situation, Background, Assessment and Recommendations (SBAR).  ID bands were compared with the identification form, and verified with the patient and receiving nurse. Information from the SBAR, Procedure Summary, Intake/Output and MAR and the Danielle Report was reviewed with the receiving nurse; opportunity for questions and clarification provided.

## 2017-01-31 NOTE — PROGRESS NOTES
Post-Partum Day Number 2 Progress/Discharge Note  Kriss Beauchamp  [id number]    Patient doing well post-partum without significant complaint. Voiding without difficulty, normal lochia, positive flatus. Vitals:  Patient Vitals for the past 8 hrs:   BP Temp Pulse Resp   17 0705 106/56 97.9 °F (36.6 °C) 96 16     Temp (24hrs), Av °F (36.7 °C), Min:97.4 °F (36.3 °C), Max:99.2 °F (37.3 °C)      Vital signs stable, afebrile. Exam:  Patient without distress. Abdomen soft, fundus firm at level of umbilicus, nontender              Labs:   Recent Results (from the past 24 hour(s))   RT--CORD BLOOD GAS    Collection Time: 17  5:29 PM   Result Value Ref Range    PH,CORD BLD ARTERIAL 7.192 (L) 7.21 - 7.31      PCO2,CORD BLD ARTERIAL 61 (H) 33 - 49 mmHg    PO2,CORD BLD ARTERIAL 20 (H) 9 - 19 mmHg    HCO3,CORD BLD ARTERIAL 23 22 - 26 mmol/L    BASE DEFICIT,CBA 6.4 (H) 0.0 - 2.0 mmol/L    O2 SAT,CORD BLD ARTERIAL 40 %    SITE CORD     Respiratory comment: cc at 2017 5 42 40 PM. Not read back. CORD BLOOD GAS VENOUS    Collection Time: 17  5:29 PM   Result Value Ref Range    PH,CORD BLD VENOUS 7.298 7.2 - 7.44      PCO2,CORD BLD VENOUS 48 (H) 14.1 - 43.3 mmHg    PO2,CORD BLD VENOUS 24 (L) 30.4 - 57.2 mmHg    HCO3,CORD BLD VENOUS 23 mmol/L    BASE DEFICIT,CBV 3.9 1.9 - 7.7 mmol/L    O2 SAT. CORD BLD VENOUS 61 %    SITE CORD     Respiratory comment: cc at 2017 5 45 11 PM. Not read back. Assessment and Plan:  Patient appears to be having uncomplicated post-partum course. Continue routine perineal care and maternal education. Plan discharge for today with follow up in our office in 6 weeks.

## 2017-01-31 NOTE — ROUTINE PROCESS
SBAR IN Report: Mother    Verbal report received from Jesenia Maloney RN (full name & credentials) on this patient, who is now being transferred from L&D (unit) for routine progression of care. The patient is not wearing a green \"Anesthesia-Duramorph\" band. Report consisted of patient's Situation, Background, Assessment and Recommendations (SBAR). [place] ID bands were compared with the identification form, and verified with the patient and transferring nurse. Information from the SBAR and Intake/Output and the Danielle Report was reviewed with the transferring nurse; opportunity for questions and clarification provided.

## 2017-01-31 NOTE — PROGRESS NOTES
Discharge teaching complete, paperwork signed, prescriptions given, questions encouraged, verbalized understanding. Pt will call out when ready to go.

## 2017-01-31 NOTE — PROGRESS NOTES
Report of care received from, Jasmyne Lopez RN.  Bedside report given, pt denies further needs at present time

## 2017-01-31 NOTE — PROGRESS NOTES
Patient reports satisfactory pain relief from labor epidural. There is no drainage or significant pain at the insertion site. Epidural catheter has been removed intact by nurses. The patient reports no residual motor or sensory side effects S/P epidural analgesia. Recheck prn.

## 2017-01-31 NOTE — LACTATION NOTE
In to see mom and baby for lactation visit. Mom reports she tried feeding first child for about 3 weeks but \"milk never came in\" so she had to start supplementing around 4 week old. Also had precancerous left breast lump discovered at 2 weeks with first child that she doesn't think contributed to low supply but could have been related. She feels this baby has done better from the start. Has done frequent skin to skin and has been latching well. Observed in cross-cradle on both sides. Baby latches well but was sleepy and came off a few times during feeding. Discussed first 24 hour expectations but that once baby is 25 hours old, needs at least 8 feedings in 24 hours. Discussed cluster feeding and 2nd night. If discharged this evening will follow-up at [facility] with pediatrician and lactation consultant tomorrow morning. Discussed insurance pumping for extra stimulation to try to establish better supply this time. Reviewed packet in detail. Advised to follow-up with pediatrician as directed or earlier with any problems such as refused feedings, decreased output, signs of jaundice etc.  Also encouraged to call us with any breastfeeding questions. Mom voices understanding of all.

## 2017-01-31 NOTE — DISCHARGE SUMMARY
Obstetrical Discharge Summary     Name: Eb Short MRN: [id number]  SSN: [id number]    YOB: 1985  Age: 33 y.o. Sex: female      Admit Date: 2017    Discharge Date: 2017     Admitting Physician: Leonard Kee MD     Attending Physician:  Leonard Kee MD     * Admission Diagnoses: LABOR;Normal labor;39 weeks gestation of pregnancy    * Discharge Diagnoses:   Information for the patient's :  Ketan Espinosa [[id number]]   Delivery of a 7 lb 13.9 oz (3.57 kg) female infant via Vaginal, Spontaneous Delivery on 2017 at 5:29 PM  by . Apgars were 7 and 9. Additional Diagnoses:   Hospital Problems as of 2017  Date Reviewed: 2017          Codes Class Noted - Resolved POA    * (Principal)Normal labor ICD-10-CM: O80, Z37.9  ICD-9-CM: 585  2017 - Present No        39 weeks gestation of pregnancy ICD-10-CM: Z3A.39  ICD-9-CM: V22.2  2017 - Present Yes        GBS (group B Streptococcus carrier), +RV culture, currently pregnant ICD-10-CM: O99.820  ICD-9-CM: V23.89, V02.51  2017 - Present Yes        Supervision of normal intrauterine pregnancy in multigravida ICD-10-CM: Z34.90  ICD-9-CM: V22.1  10/17/2016 - Present Yes    Overview Addendum 2017  1:09 PM by Leonard Kee MD     Christen Goes    Problems:    1. Prior VAVD () with midline episiotomy / partial 3rd degree per ELVA's delivery note. Pt prefers IOL at 39-40wks due to this and states this was ELVA's recommendation to her after the delivery. Birthweight was 3650 grams which is 52%ile. She pushed for 2 hours and was +2 station.   2.  GBS + -- treat in labor with Ancef                  Lab Results   Component Value Date/Time    ABO/Rh(D) O POSITIVE 2017 07:30 AM    Rubella, External Non-Immune 2016    GrBStrep, External Positive 2017    ABO,Rh O Positive  2016    There is no immunization history for the selected administration types on file for this patient. * Procedures: vag delivery             * Discharge Condition: good    [facility] Course: Normal hospital course following the delivery. * Disposition: Home    Discharge Medications:   Current Discharge Medication List      START taking these medications    Details   benzocaine-menthol (DERMOPLAST) 20-0.5 % aero Apply 1 Spray to affected area as needed for Pain. Qty: 1 mL, Refills: 3      HYDROcodone-acetaminophen (NORCO) 5-325 mg per tablet Take 1-2 Tabs by mouth every six (6) hours as needed. Max Daily Amount: 8 Tabs. Qty: 30 Tab, Refills: 0      ibuprofen (MOTRIN) 800 mg tablet Take 1 Tab by mouth every eight (8) hours as needed. Qty: 90 Tab, Refills: 0         CONTINUE these medications which have NOT CHANGED    Details   prenatal no. 40-iron-FA-dha 27mg iron- 800 mcg-250 mg cap Take  by mouth. OTC Gummy             * Follow-up Care/Patient Instructions:   Activity: No sex for 6 weeks and No driving while on analgesics  Diet: Regular Diet  Wound Care: As directed    Follow-up Information     Follow up With Details Comments Contact Info    Provider Unknown   Patient not available to ask             Signed By:  Darrion Hickey MD     January 31, 2017

## 2017-01-31 NOTE — PROGRESS NOTES
SW assessment due to no insurance and history of postpartum depression. Patient confirms no insurance and denied need for assistance with applying for Medicaid. Patient states that she does not have any concerns about her ability to pay for hospital bill. Patient confirms that she experienced depression after the delivery of her first child in 2011. Patient attributes this to several circumstances (precancerous cells found in breast 2 weeks after delivery; strained relationship with parents; work stress). Per patient, there are less stressors in her life right now.  provided education and literature on support available thru [employer] ([employer]). [employer] Warmline:  8-328-537-4PPD (3354). WWW. POSTPARTUM. NET    Family was informed of signs/symptoms, forms of intervention (medication, counseling, education), and resources (local coordinators available telephonically, monthly support group in [place] with expert\" phone sessions). Discussed importance of self-care and accepting help when offered. Family was encouraged to contact me with any questions/needs -  contact information provided.       Juventino Dobbins, [address] [facility]

## 2017-02-01 NOTE — ANESTHESIA POSTPROCEDURE EVALUATION
Post-Anesthesia Evaluation and Assessment    Patient: Yosi Jarrell MRN: [id number]  SSN: [id number]    YOB: 1985  Age: 33 y.o. Sex: female       Cardiovascular Function/Vital Signs  There were no vitals taken for this visit. Patient is status post epidural anesthesia for * No procedures listed *. Nausea/Vomiting: None    Postoperative hydration reviewed and adequate. Pain:      Managed    Neurological Status: At baseline    Mental Status and Level of Consciousness: Arousable    Pulmonary Status:       Adequate oxygenation and airway patent    Complications related to anesthesia: None    Post-anesthesia assessment completed.  No concerns    Signed By: Stephania Kaur MD     February 1, 2017

## 2017-03-16 PROBLEM — O99.820 GBS (GROUP B STREPTOCOCCUS CARRIER), +RV CULTURE, CURRENTLY PREGNANT: Status: RESOLVED | Noted: 2017-01-13 | Resolved: 2017-03-16

## 2018-05-10 ENCOUNTER — ANESTHESIA EVENT (OUTPATIENT)
Dept: SURGERY | Age: 33
End: 2018-05-10
Payer: SELF-PAY

## 2018-05-10 ENCOUNTER — HOSPITAL ENCOUNTER (OUTPATIENT)
Age: 33
Setting detail: OUTPATIENT SURGERY
Discharge: HOME | End: 2018-05-10
Attending: OBSTETRICS & GYNECOLOGY | Admitting: OBSTETRICS & GYNECOLOGY
Payer: SELF-PAY

## 2018-05-10 ENCOUNTER — ANESTHESIA (OUTPATIENT)
Dept: SURGERY | Age: 33
End: 2018-05-10
Payer: SELF-PAY

## 2018-05-10 VITALS
OXYGEN SATURATION: 99 % | HEART RATE: 100 BPM | WEIGHT: 141.4 LBS | DIASTOLIC BLOOD PRESSURE: 62 MMHG | TEMPERATURE: 98.8 F | HEIGHT: 68 IN | SYSTOLIC BLOOD PRESSURE: 122 MMHG | BODY MASS INDEX: 21.43 KG/M2 | RESPIRATION RATE: 16 BRPM

## 2018-05-10 DIAGNOSIS — R52 PAIN: Primary | ICD-10-CM

## 2018-05-10 LAB
ERYTHROCYTE [DISTWIDTH] IN BLOOD BY AUTOMATED COUNT: 12.9 % (ref 11.9–14.6)
HCG SERPL-ACNC: ABNORMAL MIU/ML (ref 0–6)
HCT VFR BLD AUTO: 41 % (ref 35.8–46.3)
HGB BLD-MCNC: 13.6 G/DL (ref 11.7–15.4)
MCH RBC QN AUTO: 29.2 PG (ref 26.1–32.9)
MCHC RBC AUTO-ENTMCNC: 33.2 G/DL (ref 31.4–35)
MCV RBC AUTO: 88.2 FL (ref 79.6–97.8)
PLATELET # BLD AUTO: 182 K/UL (ref 150–450)
PMV BLD AUTO: 10.3 FL (ref 10.8–14.1)
RBC # BLD AUTO: 4.65 M/UL (ref 4.05–5.25)
WBC # BLD AUTO: 7.6 K/UL (ref 4.3–11.1)

## 2018-05-10 PROCEDURE — 77030012317 HC CATH URET INT COVD -A: Performed by: OBSTETRICS & GYNECOLOGY

## 2018-05-10 PROCEDURE — 77030020782 HC GWN BAIR PAWS FLX 3M -B: Performed by: ANESTHESIOLOGY

## 2018-05-10 PROCEDURE — 74011250636 HC RX REV CODE- 250/636: Performed by: ANESTHESIOLOGY

## 2018-05-10 PROCEDURE — 74011250636 HC RX REV CODE- 250/636

## 2018-05-10 PROCEDURE — 77030018836 HC SOL IRR NACL ICUM -A: Performed by: OBSTETRICS & GYNECOLOGY

## 2018-05-10 PROCEDURE — 74011000250 HC RX REV CODE- 250

## 2018-05-10 PROCEDURE — 76210000016 HC OR PH I REC 1 TO 1.5 HR: Performed by: OBSTETRICS & GYNECOLOGY

## 2018-05-10 PROCEDURE — 85027 COMPLETE CBC AUTOMATED: CPT | Performed by: OBSTETRICS & GYNECOLOGY

## 2018-05-10 PROCEDURE — 76010000154 HC OR TIME FIRST 0.5 HR: Performed by: OBSTETRICS & GYNECOLOGY

## 2018-05-10 PROCEDURE — 84702 CHORIONIC GONADOTROPIN TEST: CPT | Performed by: OBSTETRICS & GYNECOLOGY

## 2018-05-10 PROCEDURE — 77030020143 HC AIRWY LARYN INTUB CGAS -A: Performed by: ANESTHESIOLOGY

## 2018-05-10 PROCEDURE — 77030008579 HC TBNG UTER SUC CARD -A: Performed by: OBSTETRICS & GYNECOLOGY

## 2018-05-10 PROCEDURE — 74011000250 HC RX REV CODE- 250: Performed by: ANESTHESIOLOGY

## 2018-05-10 PROCEDURE — 88305 TISSUE EXAM BY PATHOLOGIST: CPT | Performed by: OBSTETRICS & GYNECOLOGY

## 2018-05-10 PROCEDURE — 77030009368: Performed by: OBSTETRICS & GYNECOLOGY

## 2018-05-10 PROCEDURE — 77030011640 HC PAD GRND REM COVD -A: Performed by: OBSTETRICS & GYNECOLOGY

## 2018-05-10 PROCEDURE — 76060000031 HC ANESTHESIA FIRST 0.5 HR: Performed by: OBSTETRICS & GYNECOLOGY

## 2018-05-10 RX ORDER — HYDROCODONE BITARTRATE AND ACETAMINOPHEN 5; 325 MG/1; MG/1
1-2 TABLET ORAL
Qty: 40 TAB | Refills: 0 | OUTPATIENT
Start: 2018-05-10 | End: 2018-05-10

## 2018-05-10 RX ORDER — HYDROMORPHONE HYDROCHLORIDE 2 MG/ML
0.5 INJECTION, SOLUTION INTRAMUSCULAR; INTRAVENOUS; SUBCUTANEOUS
Status: DISCONTINUED | OUTPATIENT
Start: 2018-05-10 | End: 2018-05-10 | Stop reason: HOSPADM

## 2018-05-10 RX ORDER — FENTANYL CITRATE 50 UG/ML
100 INJECTION, SOLUTION INTRAMUSCULAR; INTRAVENOUS ONCE
Status: CANCELLED | OUTPATIENT
Start: 2018-05-10 | End: 2018-05-10

## 2018-05-10 RX ORDER — SODIUM CHLORIDE, SODIUM LACTATE, POTASSIUM CHLORIDE, CALCIUM CHLORIDE 600; 310; 30; 20 MG/100ML; MG/100ML; MG/100ML; MG/100ML
50 INJECTION, SOLUTION INTRAVENOUS CONTINUOUS
Status: DISCONTINUED | OUTPATIENT
Start: 2018-05-10 | End: 2018-05-10 | Stop reason: HOSPADM

## 2018-05-10 RX ORDER — SODIUM CHLORIDE, SODIUM LACTATE, POTASSIUM CHLORIDE, CALCIUM CHLORIDE 600; 310; 30; 20 MG/100ML; MG/100ML; MG/100ML; MG/100ML
75 INJECTION, SOLUTION INTRAVENOUS CONTINUOUS
Status: DISCONTINUED | OUTPATIENT
Start: 2018-05-10 | End: 2018-05-10 | Stop reason: HOSPADM

## 2018-05-10 RX ORDER — DEXAMETHASONE SODIUM PHOSPHATE 4 MG/ML
INJECTION, SOLUTION INTRA-ARTICULAR; INTRALESIONAL; INTRAMUSCULAR; INTRAVENOUS; SOFT TISSUE AS NEEDED
Status: DISCONTINUED | OUTPATIENT
Start: 2018-05-10 | End: 2018-05-10 | Stop reason: HOSPADM

## 2018-05-10 RX ORDER — HYDROCODONE BITARTRATE AND ACETAMINOPHEN 5; 325 MG/1; MG/1
1-2 TABLET ORAL
Qty: 40 TAB | Refills: 0 | Status: SHIPPED | OUTPATIENT
Start: 2018-05-10 | End: 2018-05-22

## 2018-05-10 RX ORDER — SODIUM CHLORIDE 0.9 % (FLUSH) 0.9 %
5-10 SYRINGE (ML) INJECTION AS NEEDED
Status: DISCONTINUED | OUTPATIENT
Start: 2018-05-10 | End: 2018-05-10 | Stop reason: HOSPADM

## 2018-05-10 RX ORDER — LIDOCAINE HYDROCHLORIDE 10 MG/ML
0.1 INJECTION INFILTRATION; PERINEURAL AS NEEDED
Status: DISCONTINUED | OUTPATIENT
Start: 2018-05-10 | End: 2018-05-10 | Stop reason: HOSPADM

## 2018-05-10 RX ORDER — ONDANSETRON 2 MG/ML
INJECTION INTRAMUSCULAR; INTRAVENOUS AS NEEDED
Status: DISCONTINUED | OUTPATIENT
Start: 2018-05-10 | End: 2018-05-10 | Stop reason: HOSPADM

## 2018-05-10 RX ORDER — LIDOCAINE HYDROCHLORIDE 20 MG/ML
INJECTION, SOLUTION EPIDURAL; INFILTRATION; INTRACAUDAL; PERINEURAL AS NEEDED
Status: DISCONTINUED | OUTPATIENT
Start: 2018-05-10 | End: 2018-05-10 | Stop reason: HOSPADM

## 2018-05-10 RX ORDER — KETOROLAC TROMETHAMINE 30 MG/ML
INJECTION, SOLUTION INTRAMUSCULAR; INTRAVENOUS AS NEEDED
Status: DISCONTINUED | OUTPATIENT
Start: 2018-05-10 | End: 2018-05-10 | Stop reason: HOSPADM

## 2018-05-10 RX ORDER — ALBUTEROL SULFATE 0.83 MG/ML
2.5 SOLUTION RESPIRATORY (INHALATION) AS NEEDED
Status: DISCONTINUED | OUTPATIENT
Start: 2018-05-10 | End: 2018-05-10 | Stop reason: HOSPADM

## 2018-05-10 RX ORDER — IBUPROFEN 800 MG/1
800 TABLET ORAL
Qty: 100 TAB | Refills: 2 | OUTPATIENT
Start: 2018-05-10 | End: 2018-05-22

## 2018-05-10 RX ORDER — FENTANYL CITRATE 50 UG/ML
INJECTION, SOLUTION INTRAMUSCULAR; INTRAVENOUS AS NEEDED
Status: DISCONTINUED | OUTPATIENT
Start: 2018-05-10 | End: 2018-05-10 | Stop reason: HOSPADM

## 2018-05-10 RX ORDER — SODIUM CHLORIDE 0.9 % (FLUSH) 0.9 %
5-10 SYRINGE (ML) INJECTION AS NEEDED
Status: CANCELLED | OUTPATIENT
Start: 2018-05-10

## 2018-05-10 RX ORDER — MIDAZOLAM HYDROCHLORIDE 1 MG/ML
2 INJECTION, SOLUTION INTRAMUSCULAR; INTRAVENOUS
Status: DISCONTINUED | OUTPATIENT
Start: 2018-05-10 | End: 2018-05-10 | Stop reason: HOSPADM

## 2018-05-10 RX ORDER — MIDAZOLAM HYDROCHLORIDE 1 MG/ML
2 INJECTION, SOLUTION INTRAMUSCULAR; INTRAVENOUS ONCE
Status: CANCELLED | OUTPATIENT
Start: 2018-05-10 | End: 2018-05-10

## 2018-05-10 RX ORDER — OXYCODONE HYDROCHLORIDE 5 MG/1
5 TABLET ORAL
Status: DISCONTINUED | OUTPATIENT
Start: 2018-05-10 | End: 2018-05-10 | Stop reason: HOSPADM

## 2018-05-10 RX ORDER — EPHEDRINE SULFATE 50 MG/ML
INJECTION, SOLUTION INTRAVENOUS AS NEEDED
Status: DISCONTINUED | OUTPATIENT
Start: 2018-05-10 | End: 2018-05-10 | Stop reason: HOSPADM

## 2018-05-10 RX ORDER — SODIUM CHLORIDE 0.9 % (FLUSH) 0.9 %
5-10 SYRINGE (ML) INJECTION EVERY 8 HOURS
Status: CANCELLED | OUTPATIENT
Start: 2018-05-10

## 2018-05-10 RX ORDER — PROPOFOL 10 MG/ML
INJECTION, EMULSION INTRAVENOUS AS NEEDED
Status: DISCONTINUED | OUTPATIENT
Start: 2018-05-10 | End: 2018-05-10 | Stop reason: HOSPADM

## 2018-05-10 RX ADMIN — EPHEDRINE SULFATE 10 MG: 50 INJECTION, SOLUTION INTRAVENOUS at 15:46

## 2018-05-10 RX ADMIN — SODIUM CHLORIDE, SODIUM LACTATE, POTASSIUM CHLORIDE, AND CALCIUM CHLORIDE 75 ML/HR: 600; 310; 30; 20 INJECTION, SOLUTION INTRAVENOUS at 13:28

## 2018-05-10 RX ADMIN — PROPOFOL 200 MG: 10 INJECTION, EMULSION INTRAVENOUS at 15:40

## 2018-05-10 RX ADMIN — FENTANYL CITRATE 50 MCG: 50 INJECTION, SOLUTION INTRAMUSCULAR; INTRAVENOUS at 15:40

## 2018-05-10 RX ADMIN — ONDANSETRON 4 MG: 2 INJECTION INTRAMUSCULAR; INTRAVENOUS at 15:48

## 2018-05-10 RX ADMIN — KETOROLAC TROMETHAMINE 30 MG: 30 INJECTION, SOLUTION INTRAMUSCULAR; INTRAVENOUS at 15:55

## 2018-05-10 RX ADMIN — LIDOCAINE HYDROCHLORIDE 0.1 ML: 10 INJECTION, SOLUTION INFILTRATION; PERINEURAL at 13:28

## 2018-05-10 RX ADMIN — LIDOCAINE HYDROCHLORIDE 100 MG: 20 INJECTION, SOLUTION EPIDURAL; INFILTRATION; INTRACAUDAL; PERINEURAL at 15:40

## 2018-05-10 RX ADMIN — SODIUM CHLORIDE, SODIUM LACTATE, POTASSIUM CHLORIDE, AND CALCIUM CHLORIDE: 600; 310; 30; 20 INJECTION, SOLUTION INTRAVENOUS at 15:35

## 2018-05-10 RX ADMIN — EPHEDRINE SULFATE 10 MG: 50 INJECTION, SOLUTION INTRAVENOUS at 15:47

## 2018-05-10 RX ADMIN — DEXAMETHASONE SODIUM PHOSPHATE 10 MG: 4 INJECTION, SOLUTION INTRA-ARTICULAR; INTRALESIONAL; INTRAMUSCULAR; INTRAVENOUS; SOFT TISSUE at 15:48

## 2018-05-10 NOTE — H&P (VIEW-ONLY)
Name: Kori Peck YOB: 1985    DATE: 2018  Age: 27 y.o. Subjective:     HPI: Juan Miguel Tran is here for recheck on pregnancy. Last week there was small embryo at about 6 wks with no FHTs. Sac size was 8w5d. She has not had any bleeding but she does report some cramping. LMP: 18    Birth Control / Sexual History:    History   Sexual Activity    Sexual activity: Yes    Partners: Male       Allergies: Allergies   Allergen Reactions    Gluten Diarrhea     Achy, digestive issues, acne    Peanut Swelling    Penicillin G Hives     Medications:    No current facility-administered medications on file prior to visit. Current Outpatient Prescriptions on File Prior to Visit   Medication Sig Dispense Refill    prenatal vit 75/iron/folic/om3 (ONE A DAY WOMEN'S PRENATAL DHA PO) Take  by mouth.  loratadine (CLARITIN) 10 mg tablet Take 10 mg by mouth.  fluticasone (FLONASE) 50 mcg/actuation nasal spray 2 Sprays by Both Nostrils route daily.        Past Surgical History:   Procedure Laterality Date    HX BREAST BIOPSY Left 2011    papilloma - 3:00; Dr. María Sotelo Left 2012    needle biopsy    HX KNEE ARTHROSCOPY Left     HX SKIN BIOPSY Left 2012    removal of dermatofibroma left inner thigh    HX WISDOM TEETH EXTRACTION       Past Medical History:   Diagnosis Date    Postpartum depression      OB History    Para Term  AB Living   2 2 2   2   SAB TAB Ectopic Molar Multiple Live Births       0 2      # Outcome Date GA Lbr Silver/2nd Weight Sex Delivery Anes PTL Lv   2 Term 17 39w1d 08:00 / 01:39 7 lb 13.9 oz (3.57 kg) F Vag-Spont EPIDURAL AN N SANTIAGO   1 Term 11 40w0d  8 lb 0.8 oz (3.65 kg) M VAVD EPIDURAL AN N SANTIAGO      Obstetric Comments   Miguel Fail         Review of Systems:  ALL POSITIVES IN RED   All positives in HPI  Constitutional:   Unexplained weight gain or loss ; heat/cold intolerance ; loss of balance  Ear, Nose, Throat[de-identified]  Blurred vision ; loss of hearing ; Hoarseness  Cardiovascular:  Chest pain ; shortness of breath when lying flat ; swelling of legs or feet  Respiratory:  Shortness of breath ; prolonged cough ; coughing of blood  GI:  Diarrhea > 2 weeks ; rectal bleeding ; tar or black colored stools ; heartburn ; nausea  Skin:  Persistent rash ; change in moles  Musculoskeletal / Neuro:  Joint pain ; muscle weakness ; seizures ; headaches  Psychiatric:  Depression (New) ; crying spells ; anxiety ; panic attacks  Hematology / Lymph:  Easy bruising ; frequent nose bleeds ; swollen lymph nodes    Gynecologic:  Bleeding or spotting between periods ; periods > 7 days ; pain with sex                            Severe menstrual cramps ; bleeding after sex ; vaginal discharge  Breast:  Spontaneous nipple discharge ; Masses ; Pain  :  Blood in urine ; pain with urination ; leakage of urine ; nocturia      Objective:      PHYSICAL EXAM   General   WDWN, No Acute Distress; normal weight   Neuro   No obvious sensory or motor defects   Psych   Oriented x3; normal mood and affect   HEENT   Normocephalic; atraumatic; no scleral icterus noted   Other   Ultrasound - no change in embryo size. No FHTs consistent with missed . The placenta has an abnormal appearance. See note from 18 for heart/lungs/pelvic    Assessment/Plan:     Encounter Diagnoses   Name Primary?  Missed  Yes    Pregnancy with uncertain fetal viability, single or unspecified fetus        Plan:  · Missed  discussed with patient and spouse. · Due to abnormal appearance of the placenta I recommend D&C. Will need to check for partial molar pregnancy on pathology. Procedure reviewed with patient.      Orders Placed This Encounter    AMB POC US OB TRANSVAGINAL     Order Specific Question:   Reason for Exam     Answer:   pregnancy with uncertain viability     Order Specific Question:   Is Patient Allergic to Contrast Dye? Answer:   Unknown     Order Specific Question:   Is Patient Pregnant?      Answer:   Yes         Signed By:  Indu Bruce MD

## 2018-05-10 NOTE — INTERVAL H&P NOTE
H&P Update:  Jabari Ackerman was seen and examined. History and physical has been reviewed. The patient has been examined.  There have been no significant clinical changes since the completion of the originally dated History and Physical.    Signed By: Tonia Mckeon MD     May 10, 2018 3:10 PM

## 2018-05-10 NOTE — DISCHARGE INSTRUCTIONS
Dilation and Curettage: What to Expect at Home  Your Recovery  Dilation and curettage (D&C) is a procedure to remove tissue from the inside of the uterus. The doctor used a curved tool, called a curette, to gently scrape tissue from your uterus. You are likely to have a backache, or cramps similar to menstrual cramps, and pass small clots of blood from your vagina for the first few days. You may continue to have light vaginal bleeding for several weeks after the procedure. You will probably be able to go back to most of your normal activities in 1 or 2 days. This care sheet gives you a general idea about how long it will take for you to recover. But each person recovers at a different pace. Follow the steps below to get better as quickly as possible. How can you care for yourself at home? Activity  ? · Rest when you feel tired. Getting enough sleep will help you recover. ? · Avoid strenuous activities, such as bicycle riding, jogging, weight lifting, or aerobic exercise, until your doctor says it is okay. ? · Most women are able to return to work the day after the procedure. ? · You may have some light vaginal bleeding. Wear sanitary pads if needed. Do not douche or use tampons for 2 weeks or until your doctor says it is okay. ? · Ask your doctor when it is okay for you to have sex. ? · If you could become pregnant, talk about birth control with your doctor. Do not try to become pregnant until your doctor says it is okay. Diet  ? · You can eat your normal diet. If your stomach is upset, try bland, low-fat foods like plain rice, broiled chicken, toast, and yogurt. ? · Drink plenty of fluids (unless your doctor tells you not to). Medicines  ? · Your doctor will tell you if and when you can restart your medicines. He or she will also give you instructions about taking any new medicines.    ? · If you take blood thinners, such as warfarin (Coumadin), clopidogrel (Plavix), or aspirin, be sure to talk to your doctor. He or she will tell you if and when to start taking those medicines again. Make sure that you understand exactly what your doctor wants you to do. ? · Be safe with medicines. Take pain medicines exactly as directed. ¨ If the doctor gave you a prescription medicine for pain, take it as prescribed. ¨ If you are not taking a prescription pain medicine, ask your doctor if you can take an over-the-counter medicine. ? · If you think your pain medicine is making you sick to your stomach:  ¨ Take your medicine after meals (unless your doctor has told you not to). ¨ Ask your doctor for a different pain medicine. ? · If your doctor prescribed antibiotics, take them as directed. Do not stop taking them just because you feel better. You need to take the full course of antibiotics. Follow-up care is a key part of your treatment and safety. Be sure to make and go to all appointments, and call your doctor if you are having problems. It's also a good idea to know your test results and keep a list of the medicines you take. When should you call for help? Call 911 anytime you think you may need emergency care. For example, call if:  ? · You passed out (lost consciousness). ? · You have chest pain, are short of breath, or cough up blood. ?Call your doctor now or seek immediate medical care if:  ? · You have bright red vaginal bleeding that soaks one or more pads in an hour, or you have large clots. ? · You have vaginal discharge that increases in amount or smells bad.   ? · You are sick to your stomach or cannot drink fluids. ? · You have pain that does not get better after you take pain medicine. ? · You cannot pass stools or gas. ? · You have symptoms of a blood clot in your leg (called a deep vein thrombosis), such as:  ¨ Pain in your calf, back of the knee, thigh, or groin. ¨ Redness and swelling in your leg.    ? · You have signs of infection, such as:  ¨ Increased pain, swelling, warmth, or redness. ¨ Red streaks leading from the area. ¨ Pus draining from the area. ¨ A fever. ? Watch closely for changes in your health, and be sure to contact your doctor if you have any problems. Where can you learn more? Go to http://blaine-rena.info/. Enter [phone] in the search box to learn more about \"Dilation and Curettage: What to Expect at Home. \"  Current as of: March 16, 2017  Content Version: 11.4  © 5283-5777 [employer]. Care instructions adapted under license by [employer] (which disclaims liability or warranty for this information). If you have questions about a medical condition or this instruction, always ask your healthcare professional. [address] any warranty or liability for your use of this information.

## 2018-05-10 NOTE — IP AVS SNAPSHOT
[address] [place] [address] 
[phone] Patient: Bea Saldana MRN: [id number] INW:[id number] About your hospitalization You were admitted on:  May 10, 2018 You last received care in the:  [facility] PACU You were discharged on:  May 10, 2018 Why you were hospitalized Your primary diagnosis was:  Not on File Follow-up Information Follow up With Details Comments Contact Info Amy Toney MD Schedule an appointment as soon as possible for a visit in 8 day(s)  [address] [place] [id number] 
[phone] None   None (395) Patient stated that they have no PCP Discharge Orders None A check maritza indicates which time of day the medication should be taken. My Medications START taking these medications Instructions Each Dose to Equal  
 Morning Noon Evening Bedtime HYDROcodone-acetaminophen 5-325 mg per tablet Commonly known as:  Magalie Llamas Your last dose was: Your next dose is: Take 1-2 Tabs by mouth every four (4) hours as needed for Pain. Max Daily Amount: 12 Tabs. 1-2 Tab  
    
   
   
   
  
 ibuprofen 800 mg tablet Commonly known as:  MOTRIN Your last dose was: Your next dose is: Take 1 Tab by mouth every six (6) hours as needed for Pain. 800 mg CONTINUE taking these medications Instructions Each Dose to Equal  
 Morning Noon Evening Bedtime CLARITIN 10 mg tablet Generic drug:  loratadine Your last dose was: Your next dose is: Take 10 mg by mouth. 10 mg  
    
   
   
   
  
 FLONASE 50 mcg/actuation nasal spray Generic drug:  fluticasone Your last dose was: Your next dose is: 2 Sprays by Both Nostrils route daily. 2 [place] ONE A DAY WOMEN'S PRENATAL DHA PO Your last dose was: Your next dose is: Take  by mouth. Where to Get Your Medications Information on where to get these meds will be given to you by the nurse or doctor. ! Ask your nurse or doctor about these medications HYDROcodone-acetaminophen 5-325 mg per tablet  
 ibuprofen 800 mg tablet Opioid Education Prescription Opioids: What You Need to Know: 
 
Prescription opioids can be used to help relieve moderate-to-severe pain and are often prescribed following a surgery or injury, or for certain health conditions. These medications can be an important part of treatment but also come with serious risks. Opioids are strong pain medicines. Examples include hydrocodone, oxycodone, fentanyl, and morphine. Heroin is an example of an illegal opioid. It is important to work with your health care provider to make sure you are getting the safest, most effective care. WHAT ARE THE RISKS AND SIDE EFFECTS OF OPIOID USE? Prescription opioids carry serious risks of addiction and overdose, especially with prolonged use. An opioid overdose, often marked by slow breathing, can cause sudden death. The use of prescription opioids can have a number of side effects as well, even when taken as directed. · Tolerance-meaning you might need to take more of a medication for the same pain relief · Physical dependence-meaning you have symptoms of withdrawal when the medication is stopped. Withdrawal symptoms can include nausea, sweating, chills, diarrhea, stomach cramps, and muscle aches. Withdrawal can last up to several weeks, depending on which drug you took and how long you took it. · Increased sensitivity to pain · Constipation · Nausea, vomiting, and dry mouth · Sleepiness and dizziness · Confusion · Depression · Low levels of testosterone that can result in lower sex drive, energy, and strength · Itching and sweating RISKS ARE GREATER WITH:      
 · History of drug misuse, substance use disorder, or overdose · Mental health conditions (such as depression or anxiety) · Sleep apnea · Older age (72 years or older) · Pregnancy Avoid alcohol while taking prescription opioids. Also, unless specifically advised by your health care provider, medications to avoid include: · Benzodiazepines (such as Xanax or Valium) · Muscle relaxants (such as Soma or Flexeril) · Hypnotics (such as Ambien or Lunesta) · Other prescription opioids KNOW YOUR OPTIONS Talk to your health care provider about ways to manage your pain that don't involve prescription opioids. Some of these options may actually work better and have fewer risks and side effects. Options may include: 
· Pain relievers such as acetaminophen, ibuprofen, and naproxen · Some medications that are also used for depression or seizures · Physical therapy and exercise · Counseling to help patients learn how to cope better with triggers of pain and stress. · Application of heat or cold compress · Massage therapy · Relaxation techniques Be Informed Make sure you know the name of your medication, how much and how often to take it, and its potential risks & side effects. IF YOU ARE PRESCRIBED OPIOIDS FOR PAIN: 
· Never take opioids in greater amounts or more often than prescribed. Remember the goal is not to be pain-free but to manage your pain at a tolerable level. · Follow up with your primary care provider to: · Work together to create a plan on how to manage your pain. · Talk about ways to help manage your pain that don't involve prescription opioids. · Talk about any and all concerns and side effects. · Help prevent misuse and abuse. · Never sell or share prescription opioids · Help prevent misuse and abuse. · Store prescription opioids in a secure place and out of reach of others (this may include visitors, children, friends, and family). · Safely dispose of unused/unwanted prescription opioids: Find your community drug take-back program or your pharmacy mail-back program, or flush them down the toilet, following guidance from the Food and Drug Administration (www.fda.gov/Drugs/ResourcesForYou). · Visit www.cdc.gov/drugoverdose to learn about the risks of opioid abuse and overdose. · If you believe you may be struggling with addiction, tell your health care provider and ask for guidance or call [employer] at 1-226-275-KAOK. Discharge Instructions Dilation and Curettage: What to Expect at [facility] Your Recovery Dilation and curettage (D&C) is a procedure to remove tissue from the inside of the uterus. The doctor used a curved tool, called a curette, to gently scrape tissue from your uterus. You are likely to have a backache, or cramps similar to menstrual cramps, and pass small clots of blood from your vagina for the first few days. You may continue to have light vaginal bleeding for several weeks after the procedure. You will probably be able to go back to most of your normal activities in 1 or 2 days. This care sheet gives you a general idea about how long it will take for you to recover. But each person recovers at a different pace. Follow the steps below to get better as quickly as possible. How can you care for yourself at home? Activity ? · Rest when you feel tired. Getting enough sleep will help you recover. ? · Avoid strenuous activities, such as bicycle riding, jogging, weight lifting, or aerobic exercise, until your doctor says it is okay. ? · Most women are able to return to work the day after the procedure. ? · You may have some light vaginal bleeding. Wear sanitary pads if needed. Do not douche or use tampons for 2 weeks or until your doctor says it is okay. ? · Ask your doctor when it is okay for you to have sex. ? · If you could become pregnant, talk about birth control with your doctor. Do not try to become pregnant until your doctor says it is okay. Diet ? · You can eat your normal diet. If your stomach is upset, try bland, low-fat foods like plain rice, broiled chicken, toast, and yogurt. ? · Drink plenty of fluids (unless your doctor tells you not to). Medicines ? · Your doctor will tell you if and when you can restart your medicines. He or she will also give you instructions about taking any new medicines. ? · If you take blood thinners, such as warfarin (Coumadin), clopidogrel (Plavix), or aspirin, be sure to talk to your doctor. He or she will tell you if and when to start taking those medicines again. Make sure that you understand exactly what your doctor wants you to do. ? · Be safe with medicines. Take pain medicines exactly as directed. ¨ If the doctor gave you a prescription medicine for pain, take it as prescribed. ¨ If you are not taking a prescription pain medicine, ask your doctor if you can take an over-the-counter medicine. ? · If you think your pain medicine is making you sick to your stomach: 
¨ Take your medicine after meals (unless your doctor has told you not to). ¨ Ask your doctor for a different pain medicine. ? · If your doctor prescribed antibiotics, take them as directed. Do not stop taking them just because you feel better. You need to take the full course of antibiotics. Follow-up care is a key part of your treatment and safety. Be sure to make and go to all appointments, and call your doctor if you are having problems. It's also a good idea to know your test results and keep a list of the medicines you take. When should you call for help? Call 911 anytime you think you may need emergency care. For example, call if: 
? · You passed out (lost consciousness). ? · You have chest pain, are short of breath, or cough up blood. ?Call your doctor now or seek immediate medical care if: 
? · You have bright red vaginal bleeding that soaks one or more pads in an hour, or you have large clots. ? · You have vaginal discharge that increases in amount or smells bad.  
? · You are sick to your stomach or cannot drink fluids. ? · You have pain that does not get better after you take pain medicine. ? · You cannot pass stools or gas. ? · You have symptoms of a blood clot in your leg (called a deep vein thrombosis), such as: 
¨ Pain in your calf, back of the knee, thigh, or groin. ¨ Redness and swelling in your leg. ? · You have signs of infection, such as: 
¨ Increased pain, swelling, warmth, or redness. ¨ Red streaks leading from the area. ¨ Pus draining from the area. ¨ A fever. ? Watch closely for changes in your health, and be sure to contact your doctor if you have any problems. Where can you learn more? Go to http://blaine-rena.info/. Enter [phone] in the search box to learn more about \"Dilation and Curettage: What to Expect at Home. \" Current as of: March 16, 2017 Content Version: 11.4 © 7647-0431 Anna Lozabai. Care instructions adapted under license by [employer] (which disclaims liability or warranty for this information). If you have questions about a medical condition or this instruction, always ask your healthcare professional. [place] [address] any warranty or liability for your use of this information. Introducing [facility] & HEALTH SERVICES! Carmen Jimenez introduces [employer] patient portal. Now you can access parts of your medical record, email your doctor's office, and request medication refills online. 1. In your internet browser, go to [email or web address] 2. Click on the First Time User? Click Here link in the Sign In box. You will see the New Member Sign Up page. 3. Enter your [employer] Access Code exactly as it appears below. You will not need to use this code after youve completed the sign-up process. If you do not sign up before the expiration date, you must request a new code. · [employer] Access Code: [email or web address] Expires: 7/31/2018 10:11 AM 
 
4. Enter the last four digits of your Social Security Number (xxxx) and Date of Birth (mm/dd/yyyy) as indicated and click Submit. You will be taken to the next sign-up page. 5. Create a CarePaymentt ID. This will be your [employer] login ID and cannot be changed, so think of one that is secure and easy to remember. 6. Create a [employer] password. You can change your password at any time. 7. Enter your Password Reset Question and Answer. This can be used at a later time if you forget your password. 8. Enter your e-mail address. You will receive e-mail notification when new information is available in [address] 9. Click Sign Up. You can now view and download portions of your medical record. 10. Click the Download Summary menu link to download a portable copy of your medical information. If you have questions, please visit the Frequently Asked Questions section of the [employer] website. Remember, [employer] is NOT to be used for urgent needs. For medical emergencies, dial 911. Now available from your iPhone and Android! Introducing Ole Goodson As a [facility] patient, I wanted to make you aware of our electronic visit tool called Ole Goodson. [facility] 24/7 allows you to connect within minutes with a medical provider 24 hours a day, seven days a week via a mobile device or tablet or logging into a secure website from your computer. You can access Ole Goodson from anywhere in the [place].  
 
A virtual visit might be right for you when you have a simple condition and feel like you just dont want to get out of bed, or cant get away from work for an appointment, when your regular [facility] provider is not available (evenings, weekends or holidays), or when youre out of town and need minor care. Electronic visits cost only $49 and if the [facility] 24/7 provider determines a prescription is needed to treat your condition, one can be electronically transmitted to a nearby pharmacy*. Please take a moment to enroll today if you have not already done so. The enrollment process is free and takes just a few minutes. To enroll, please download the [facility] 24/7 gaurav to your tablet or phone, or visit [email or web address] to enroll on your computer. And, as an [address] [place] [address] patient with a [employer] account, the results of your visits will be scanned into your electronic medical record and your primary care provider will be able to view the scanned results. We urge you to continue to see your regular [facility] provider for your ongoing medical care. And while your primary care provider may not be the one available when you seek a [facility] virtual visit, the peace of mind you get from getting a real diagnosis real time can be priceless. For more information on [facility], view our Frequently Asked Questions (FAQs) at [email or web address]. Sincerely, 
 
Eden Trejo MD 
Chief Medical Officer [address] *:  certain medications cannot be prescribed via [facility] Providers Seen During Your Hospitalization Provider Specialty Primary office phone Cesar Shay MD Obstetrics & Gynecology [phone] Your Primary Care Physician (PCP) Primary Care Physician Office Phone Office Fax NONE ** None ** ** None ** You are allergic to the following Allergen Reactions Gluten Diarrhea Achy, digestive issues, acne Peanut Swelling Penicillin G Hives Recent Documentation Height Weight BMI OB Status Smoking Status 1.727 m 64.1 kg 21.5 kg/m2 Unknown Never Smoker Emergency Contacts Name Discharge Info Relation Home Work Mobile Da Self  Spouse [3] [phone] Patient Belongings The following personal items are in your possession at time of discharge: 
  Dental Appliances: None         Home Medications: None   Jewelry: None  Clothing: Shirt, Pants, Footwear, Undergarments    Other Valuables: None Please provide this summary of care documentation to your next provider. Signatures-by signing, you are acknowledging that this After Visit Summary has been reviewed with you and you have received a copy. Patient Signature:  ____________________________________________________________ Date:  ____________________________________________________________  
  
Millie Oka Provider Signature:  ____________________________________________________________ Date:  ____________________________________________________________

## 2018-05-10 NOTE — OP NOTES
Operative Note     Patient ID:   Name: Alicia Lazaro    Medical Record Number: [id number]    YOB: 1985    Date of Surgery: 5/10/2018      Preoperative Diagnosis: Missed ab [O02.1]    Postoperative Diagnosis: Missed ab [O02.1]    Surgeon:  Cesar Shay MD     Anesthesia: General    Procedure: Dilation with suction Curettage    Estimated Blood Loss: 100cc    Pathology /Specimens:     ID Type Source Tests Collected by Time Destination   1 : Products of conception Fresh Uterus  Cesar Shay MD 5/10/2018 1550 Pathology       DVT Prophylaxis: none    Antibiotic Prophylaxis: none    DESCRIPTION OF PROCEDURE: The patient was placed on the operating room table in the supine position and placed under general anesthesia. Time out was done to confirm the operating procedure, surgeon, patient and site. Once confirmed by the team, procedure was started. She was repositioned in the dorsal lithotomy position and prepped and draped in the usual fashion for vaginal surgery. Cervix was exposed with a weighted vaginal speculum and grasped with a ring forcep. Endometrial sound was performed and the uterine cavity measured 12 cm. The cervix was dilated. A curved 9 suction curette device was then introduced into the endometrial cavity  Thorough suction curettage followed by sharp curettage with a large curette followed again by suction curettage was performed until the suction returned no further clot or products of conception. A good uterine crie was obtained. The uterus was massaged. Hemostasis appeared normal, Instruments were removed. The patient went to the recovery room in satisfactory condition. All counts were correct times two. There were no complications.      Cesar Shay MD

## 2018-05-10 NOTE — PROGRESS NOTES
Support given to patient and  in 7 week fetal demise. \"[employer]\" early pregnancy loss brochure and Wendy Fragoso support group information given along with contact information. Philomena Burns M.Div.

## 2018-05-10 NOTE — ANESTHESIA PREPROCEDURE EVALUATION
Anesthetic History   No history of anesthetic complications            Review of Systems / Medical History  Patient summary reviewed, nursing notes reviewed and pertinent labs reviewed    Pulmonary  Within defined limits                 Neuro/Psych   Within defined limits           Cardiovascular                  Exercise tolerance: >4 METS     GI/Hepatic/Renal                Endo/Other             Other Findings              Physical Exam    Airway  Mallampati: II  TM Distance: 4 - 6 cm  Neck ROM: normal range of motion   Mouth opening: Normal     Cardiovascular    Rhythm: regular           Dental  No notable dental hx       Pulmonary  Breath sounds clear to auscultation               Abdominal         Other Findings            Anesthetic Plan    ASA: 2  Anesthesia type: general          Induction: Intravenous  Anesthetic plan and risks discussed with: Patient

## 2018-05-10 NOTE — IP AVS SNAPSHOT
Summary of Care Report The Summary of Care report has been created to help improve care coordination. Users with access to [employer] or [employer] [address] (Web-based application) may access additional patient information including the Discharge Summary. If you are not currently a [address] user and need more information, please call the number listed below in the [place] [address] section and ask to be connected with Medical Records. Facility Information Name Address Phone [address] [place] [address] [address] [place] [id number] [phone] Patient Information Patient Name Sex  Jacob Disla ([id number]) Female 1985 Discharge Information Admitting Provider Service Area Unit Greer Gandara MD / Shila Sebeka / [phone] Discharge Provider Discharge Date/Time Discharge Disposition Destination (none) (none) (none) (none) Patient Language Language ENGLISH [13] Hospital Problems as of 5/10/2018  Reviewed: 2017 11:12 AM by Guadalupe Ng MD  
 None Non-Hospital Problems as of 5/10/2018  Reviewed: 2017 11:12 AM by Guadalupe Ng MD  
  
  
  
 Class Noted - Resolved Last Modified Active Problems Atypical squamous cells of undetermined significance (ASCUS) on Papanicolaou smear of cervix  2015 - Present 2015 by Greer Gandara MD  
  Entered by Greer Gandara MD  
  Overview Signed 2015  9:39 AM by Greer Gandara MD  
   12/10/15 - ASCUS / negative HRHPV -- repeat pap one year Supervision of normal intrauterine pregnancy in multigravida  10/17/2016 - Present 2017 by Guadalupe Ng MD  
  Entered by Guadalupe Ng MD  
  Overview Addendum 2017  1:09 PM by Greer Gandara MD  
   Osito Marcel Problems: 1. Prior VAVD (2011) with midline episiotomy / partial 3rd degree per ELVA's delivery note. Pt prefers IOL at 39-40wks due to this and states this was ELVA's recommendation to her after the delivery. Birthweight was 3650 grams which is 52%ile. She pushed for 2 hours and was +2 station. 2.  GBS + -- treat in labor with Ancef Normal labor  1/30/2017 - Present 1/30/2017 by Reymundo Robertson MD  
  Entered by Bradley Knott MD  
  39 weeks gestation of pregnancy  1/30/2017 - Present 1/30/2017 by Reymundo Robertson MD  
  Entered by Bradley Knott MD  
  
You are allergic to the following Allergen Reactions Gluten Diarrhea Achy, digestive issues, acne Peanut Swelling Penicillin G Hives Current Discharge Medication List  
  
ASK your doctor about these medications Dose & Instructions Dispensing Information Comments CLARITIN 10 mg tablet Generic drug:  loratadine Dose:  10 mg Take 10 mg by mouth. Refills:  0  
   
 FLONASE 50 mcg/actuation nasal spray Generic drug:  fluticasone Dose:  2 Spray 2 Sprays by Both Nostrils route daily. Refills:  0  
   
 ONE A DAY WOMEN'S PRENATAL DHA PO Take  by mouth. Refills:  0 Current Immunizations Name Date MMR 1/31/2017 Surgery Information ID Date/Time Status Primary Surgeon All Procedures Location [id number] 5/10/2018 Estevan Nguyen MD DILATATION AND CURETTAGE WITH SUCTION/ 7W/ O+ SFE MAIN OR Follow-up Information Follow up With Details Comments Contact Info Bradley Knott MD Schedule an appointment as soon as possible for a visit in 2 week(s)  [address] [place] [id number] [phone] Discharge Instructions Dilation and Curettage: What to Expect at [facility] Your Recovery Dilation and curettage (D&C) is a procedure to remove tissue from the inside of the uterus. The doctor used a curved tool, called a curette, to gently scrape tissue from your uterus. You are likely to have a backache, or cramps similar to menstrual cramps, and pass small clots of blood from your vagina for the first few days. You may continue to have light vaginal bleeding for several weeks after the procedure. You will probably be able to go back to most of your normal activities in 1 or 2 days. This care sheet gives you a general idea about how long it will take for you to recover. But each person recovers at a different pace. Follow the steps below to get better as quickly as possible. How can you care for yourself at home? Activity ? · Rest when you feel tired. Getting enough sleep will help you recover. ? · Avoid strenuous activities, such as bicycle riding, jogging, weight lifting, or aerobic exercise, until your doctor says it is okay. ? · Most women are able to return to work the day after the procedure. ? · You may have some light vaginal bleeding. Wear sanitary pads if needed. Do not douche or use tampons for 2 weeks or until your doctor says it is okay. ? · Ask your doctor when it is okay for you to have sex. ? · If you could become pregnant, talk about birth control with your doctor. Do not try to become pregnant until your doctor says it is okay. Diet ? · You can eat your normal diet. If your stomach is upset, try bland, low-fat foods like plain rice, broiled chicken, toast, and yogurt. ? · Drink plenty of fluids (unless your doctor tells you not to). Medicines ? · Your doctor will tell you if and when you can restart your medicines. He or she will also give you instructions about taking any new medicines. ? · If you take blood thinners, such as warfarin (Coumadin), clopidogrel (Plavix), or aspirin, be sure to talk to your doctor. He or she will tell you if and when to start taking those medicines again.  Make sure that you understand exactly what your doctor wants you to do. ? · Be safe with medicines. Take pain medicines exactly as directed. ¨ If the doctor gave you a prescription medicine for pain, take it as prescribed. ¨ If you are not taking a prescription pain medicine, ask your doctor if you can take an over-the-counter medicine. ? · If you think your pain medicine is making you sick to your stomach: 
¨ Take your medicine after meals (unless your doctor has told you not to). ¨ Ask your doctor for a different pain medicine. ? · If your doctor prescribed antibiotics, take them as directed. Do not stop taking them just because you feel better. You need to take the full course of antibiotics. Follow-up care is a key part of your treatment and safety. Be sure to make and go to all appointments, and call your doctor if you are having problems. It's also a good idea to know your test results and keep a list of the medicines you take. When should you call for help? Call 911 anytime you think you may need emergency care. For example, call if: 
? · You passed out (lost consciousness). ? · You have chest pain, are short of breath, or cough up blood. ?Call your doctor now or seek immediate medical care if: 
? · You have bright red vaginal bleeding that soaks one or more pads in an hour, or you have large clots. ? · You have vaginal discharge that increases in amount or smells bad.  
? · You are sick to your stomach or cannot drink fluids. ? · You have pain that does not get better after you take pain medicine. ? · You cannot pass stools or gas. ? · You have symptoms of a blood clot in your leg (called a deep vein thrombosis), such as: 
¨ Pain in your calf, back of the knee, thigh, or groin. ¨ Redness and swelling in your leg. ? · You have signs of infection, such as: 
¨ Increased pain, swelling, warmth, or redness. ¨ Red streaks leading from the area. ¨ Pus draining from the area. ¨ A fever. ?Watch closely for changes in your health, and be sure to contact your doctor if you have any problems. Where can you learn more? Go to http://blaine-rena.info/. Enter [phone] in the search box to learn more about \"Dilation and Curettage: What to Expect at Home. \" Current as of: March 16, 2017 Content Version: 11.4 © 8185-0810 [employer]. Care instructions adapted under license by [employer] (which disclaims liability or warranty for this information). If you have questions about a medical condition or this instruction, always ask your healthcare professional. [place] [address] any warranty or liability for your use of this information. Chart Review Routing History No Routing History on File

## 2018-05-18 NOTE — ANESTHESIA POSTPROCEDURE EVALUATION
Post-Anesthesia Evaluation and Assessment    Patient: Loraine Skiff MRN: [id number]  SSN: [id number]    YOB: 1985  Age: 27 y.o. Sex: female       Cardiovascular Function/Vital Signs  Visit Vitals    /62 (BP 1 Location: Left arm, BP Patient Position: At rest)    Pulse 100    Temp 37.1 °C (98.8 °F)    Resp 16    Ht 5' 8\" (1.727 m)    Wt 64.1 kg (141 lb 6.4 oz)    SpO2 99%    BMI 21.5 kg/m2       Patient is status post general anesthesia for Procedure(s):  DILATATION AND CURETTAGE WITH SUCTION/ 7W/ O+. Nausea/Vomiting: None    Postoperative hydration reviewed and adequate. Pain:  Pain Scale 1: Visual (05/10/18 1649)  Pain Intensity 1: 0 (05/10/18 1649)   Managed    Neurological Status:   Neuro (WDL): Within Defined Limits (05/10/18 1649)  Neuro  Neurologic State: Alert;Eyes open spontaneously (05/10/18 1649)  Cognition: Follows commands (05/10/18 1649)  LUE Motor Response: Purposeful (05/10/18 1649)  LLE Motor Response: Purposeful (05/10/18 1649)  RUE Motor Response: Purposeful (05/10/18 1649)  RLE Motor Response: Purposeful (05/10/18 1649)   At baseline    Mental Status and Level of Consciousness: Arousable    Pulmonary Status:   O2 Device: Room air (05/10/18 1649)   Adequate oxygenation and airway patent    Complications related to anesthesia: None    Post-anesthesia assessment completed.  No concerns    Signed By: Yuri Correia MD     May 18, 2018

## 2019-08-07 PROBLEM — Z37.9 NORMAL LABOR: Status: RESOLVED | Noted: 2017-01-30 | Resolved: 2019-08-07

## 2019-08-07 PROBLEM — Z3A.39 39 WEEKS GESTATION OF PREGNANCY: Status: RESOLVED | Noted: 2017-01-30 | Resolved: 2019-08-07

## 2019-08-22 ENCOUNTER — HOSPITAL ENCOUNTER (OUTPATIENT)
Dept: ULTRASOUND IMAGING | Age: 34
Discharge: HOME | End: 2019-08-22
Attending: OBSTETRICS & GYNECOLOGY
Payer: SELF-PAY

## 2019-08-22 DIAGNOSIS — E04.9 ENLARGED THYROID: ICD-10-CM

## 2019-08-22 PROCEDURE — 76536 US EXAM OF HEAD AND NECK: CPT

## 2019-08-22 NOTE — PROGRESS NOTES
Please call her. Her ultrasound did show a nodule in both sides of the thyroid. We need to get a biopsy on it. Please get consult for endocrinology thyroid nodule clinic for biopsy.

## 2020-09-02 ENCOUNTER — HOSPITAL ENCOUNTER (OUTPATIENT)
Dept: MAMMOGRAPHY | Age: 35
Discharge: HOME | End: 2020-09-02
Attending: OBSTETRICS & GYNECOLOGY

## 2020-09-02 DIAGNOSIS — Z12.31 OTHER SCREENING MAMMOGRAM: ICD-10-CM

## 2020-09-02 PROCEDURE — 77063 BREAST TOMOSYNTHESIS BI: CPT

## 2021-09-17 ENCOUNTER — HOSPITAL ENCOUNTER (OUTPATIENT)
Dept: MAMMOGRAPHY | Age: 36
Discharge: HOME | End: 2021-09-17
Attending: OBSTETRICS & GYNECOLOGY

## 2021-09-17 DIAGNOSIS — Z12.31 ENCOUNTER FOR SCREENING MAMMOGRAM FOR BREAST CANCER: ICD-10-CM

## 2021-09-17 PROCEDURE — 77063 BREAST TOMOSYNTHESIS BI: CPT

## 2022-09-06 ENCOUNTER — OFFICE VISIT (OUTPATIENT)
Dept: ENDOCRINOLOGY | Age: 37
End: 2022-09-06

## 2022-09-06 VITALS
SYSTOLIC BLOOD PRESSURE: 96 MMHG | HEART RATE: 91 BPM | OXYGEN SATURATION: 98 % | WEIGHT: 161 LBS | BODY MASS INDEX: 24.48 KG/M2 | DIASTOLIC BLOOD PRESSURE: 70 MMHG

## 2022-09-06 DIAGNOSIS — E04.2 MULTIPLE THYROID NODULES: Primary | ICD-10-CM

## 2022-09-06 PROCEDURE — 99213 OFFICE O/P EST LOW 20 MIN: CPT | Performed by: INTERNAL MEDICINE

## 2022-09-06 PROCEDURE — 76536 US EXAM OF HEAD AND NECK: CPT | Performed by: INTERNAL MEDICINE

## 2022-09-06 ASSESSMENT — ENCOUNTER SYMPTOMS
VOICE CHANGE: 0
TROUBLE SWALLOWING: 0

## 2022-09-06 NOTE — PROGRESS NOTES
Dedra Calvin MD, [address] [place] [address]            Reason for visit: Follow-up of thyroid nodules      ASSESSMENT AND PLAN:    1. Multiple thyroid nodules  Prior biopsy of the dominant nodule at the right lower pole in 2019 was fortunately benign. Ultrasound was repeated today (see below) and is unchanged. Repeat in 1 year. - US,HEAD/NECK TISSUES,REAL TIME      PROCEDURES:    HEAD AND NECK ULTRASOUND    Date of study:  2022    Performing/interpreting physician:  Dedra Calvin MD, FACE    Indication:  thyroid nodule    Technique:  Using a 12 MHz linear transducer, multiple real-time planar images were obtained of the thyroid and surrounding tissues. Findings:  Right lobe 1.60 x 1.34 x 4.29 cm, isthmus 0.25 cm, left lobe 1.08 x 1.56 x 3.06 cm. Homogeneous echotexture. Normal blood flow. 1.28 x 1.40 x 1.57 cm isoechoic nodule with increased peripheral but not central blood flow and no calcifications at the right lower pole. 0.49 x 0.68 x 0.69 cm hyperechoic nodule without calcifications or increased blood flow at the right lower pole. Impression: Multiple thyroid nodules as noted. Follow-up and Dispositions    Return in about 1 year (around 2023). History of Present Illness:    THYROID NODULES  Russell Tran is seen today in the [place] [address] for follow-up of thyroid nodules, incidentally noted on physical examination in 2019 by Dr. Yoly Jacobs. Symptoms: See review of systems below        Risk factors for malignancy: There is not a history of radiation to the head/neck. The patient does not have a family history of thyroid cancer. Prior imagin2019: Ultrasound (Varsha Hernandez)- Right lobe 4.3 x 1.4 x 1.8 cm, isthmus 0.3 cm, left lobe 4.0 x 1.4 x 1.3 cm. 1.1 x 1.8 x 1.5 cm hyperechoic nodule in the right lobe. 0.7 x 0.9 x 0.5 cm hyperechoic nodule at the right lower pole.      3/9/2020: Ultrasound (Coe)- Right lobe 1.54 x 1.39 x 4.45 cm, isthmus 0.10 cm, left lobe 1.09 x 1.51 x 3.72 cm. Homogeneous echotexture. Normal blood flow. 1.09 x 1.14 x 1.48 cm isoechoic nodule with increased peripheral and central blood flow and no calcifications at the right lower pole. 0.53 x 0.64 x 0.82 cm slightly hyperechoic nodule without calcifications or increased blood flow at the right lower pole. 3/9/2021: Ultrasound (Coe)- Right lobe 1.40 x 1.48 x 4.56 cm, isthmus 0.17 cm, left lobe 1.12 x 1.54 x 3.98 cm. Homogeneous echotexture. Normal blood flow. 1.14 x 1.35 x 1.68 cm isoechoic nodule with increased peripheral but not central blood flow and no calcifications at the right lower pole. 0.46 x 0.58 x 0.83 cm hyperechoic nodule without calcifications or increased blood flow at the right lower pole. 9/6/2022: Ultrasound (Coe)- Right lobe 1.60 x 1.34 x 4.29 cm, isthmus 0.25 cm, left lobe 1.08 x 1.56 x 3.06 cm. Homogeneous echotexture. Normal blood flow. 1.28 x 1.40 x 1.57 cm isoechoic nodule with increased peripheral but not central blood flow and no calcifications at the right lower pole. 0.49 x 0.68 x 0.69 cm hyperechoic nodule without calcifications or increased blood flow at the right lower pole. Prior laboratory evaluation:  8/7/2019: TSH 1.500. Prior biopsies:  9/6/2019: Fine-needle aspiration biopsy of 0.99 x 1.03 x 1.67 cm isoechoic nodule at the right lower pole (Coe/Veracyte)- cytology BENIGN. Review of Systems   Constitutional:  Negative for fatigue and unexpected weight change. HENT:  Negative for trouble swallowing and voice change. BP 96/70 (Site: Left Upper Arm, Position: Sitting)   Pulse 91   Wt 161 lb (73 kg)   SpO2 98%   BMI 24.48 kg/m²   Wt Readings from Last 3 Encounters:   09/06/22 161 lb (73 kg)   08/20/21 161 lb (73 kg)   03/09/21 161 lb (73 kg)       Physical Exam  HENT:      Head: Normocephalic.    Neck:      Thyroid: No thyroid mass or thyromegaly. Cardiovascular:      Rate and Rhythm: Normal rate. Pulmonary:      Effort: No respiratory distress. Breath sounds: Normal breath sounds. Neurological:      Mental Status: She is alert. Psychiatric:         Mood and Affect: Mood normal.         Behavior: Behavior normal.       Orders Placed This Encounter   Procedures    US,HEAD/NECK TISSUES,REAL TIME       No current outpatient medications on file. No current facility-administered medications for this visit. Jacklyn Rivera MD, FACE      Portions of this note were generated with the assistance of voice recognition software. As such, some errors in transcription may be present.

## 2023-01-12 ENCOUNTER — OFFICE VISIT (OUTPATIENT)
Dept: OBGYN CLINIC | Age: 38
End: 2023-01-12

## 2023-01-12 VITALS
DIASTOLIC BLOOD PRESSURE: 78 MMHG | SYSTOLIC BLOOD PRESSURE: 118 MMHG | WEIGHT: 162 LBS | BODY MASS INDEX: 24.55 KG/M2 | HEIGHT: 68 IN

## 2023-01-12 DIAGNOSIS — Z11.51 SCREENING FOR HUMAN PAPILLOMAVIRUS (HPV): ICD-10-CM

## 2023-01-12 DIAGNOSIS — Z12.4 SCREENING FOR CERVICAL CANCER: ICD-10-CM

## 2023-01-12 DIAGNOSIS — Z12.31 ENCOUNTER FOR SCREENING MAMMOGRAM FOR MALIGNANT NEOPLASM OF BREAST: ICD-10-CM

## 2023-01-12 DIAGNOSIS — Z01.419 WELL WOMAN EXAM WITH ROUTINE GYNECOLOGICAL EXAM: Primary | ICD-10-CM

## 2023-01-12 PROCEDURE — 99395 PREV VISIT EST AGE 18-39: CPT | Performed by: OBSTETRICS & GYNECOLOGY

## 2023-01-13 NOTE — PROGRESS NOTES
Patient presents today for   Chief Complaint   Patient presents with    Annual Exam     C/o late period in November. LMP: Patient's last menstrual period was 12/17/2022. Contraception: none        Allergies   Allergen Reactions    Gluten Meal Diarrhea     Achy, digestive issues, acne    Penicillin G Hives     No current outpatient medications on file. No current facility-administered medications for this visit. Past Medical History:   Diagnosis Date    Multiple thyroid nodules     Postpartum depression      Past Surgical History:   Procedure Laterality Date    BREAST BIOPSY Left 2012    needle biopsy    BREAST BIOPSY Left 5/2011    papilloma - 3:00; Dr. Maggie Campbell  05/10/2018    w/ suction    KNEE ARTHROSCOPY Left 2008    SKIN BIOPSY Left 2012    removal of dermatofibroma left inner thigh    WISDOM TOOTH EXTRACTION       Social History     Socioeconomic History    Marital status:      Spouse name: Not on file    Number of children: Not on file    Years of education: Not on file    Highest education level: Not on file   Occupational History    Not on file   Tobacco Use    Smoking status: Never    Smokeless tobacco: Never   Vaping Use    Vaping Use: Never used   Substance and Sexual Activity    Alcohol use:  Yes     Alcohol/week: 0.8 standard drinks    Drug use: No    Sexual activity: Yes     Partners: Male     Birth control/protection: Condom   Other Topics Concern    Not on file   Social History Narrative    GYN: history of abnormal pap, no GC, CT or HSV     Social Determinants of Health     Financial Resource Strain: Not on file   Food Insecurity: Not on file   Transportation Needs: Not on file   Physical Activity: Not on file   Stress: Not on file   Social Connections: Not on file   Intimate Partner Violence: Not on file   Housing Stability: Not on file     Family History   Problem Relation Age of Onset    Hypertension Paternal Grandmother     Heart Disease Paternal Grandmother     Lung Cancer Maternal Grandmother [de-identified]    Uterine Cancer Mother     Endometrial Cancer Mother     Cancer Sister         cervical    Uterine Cancer Maternal Aunt     Ovarian Cancer Maternal Aunt     Breast Cancer Neg Hx     Thyroid Cancer Neg Hx      /78   Ht 5' 8\" (1.727 m)   Wt 162 lb (73.5 kg)   LMP 12/17/2022   BMI 24.63 kg/m²      ROS:  Constitutional: Negative. HENT: Negative. Eyes: Negative. Respiratory: Negative. Cardiovascular: Negative. Gastrointestinal: Negative. Endocrine: Negative. Genitourinary: Negative. Musculoskeletal: Negative. Allergic/Immunologic: Negative. Neurological: Negative. Hematological: Negative. Psychiatric/Behavioral: Negative. Physical Exam  Vitals signs reviewed. Constitutional:       General: She is not in acute distress. Appearance: Normal appearance. She is well-developed. She is not ill-appearing, toxic-appearing or diaphoretic. HENT:      Head: Normocephalic and atraumatic. Eyes:      General: No scleral icterus. Conjunctiva/sclera: Conjunctivae normal.      Pupils: Pupils are equal, round, and reactive to light. Neck:      Musculoskeletal: Normal range of motion and neck supple. Thyroid: No thyromegaly. Vascular: No JVD. Trachea: No tracheal deviation. Cardiovascular:      Rate and Rhythm: Normal rate and regular rhythm. Heart sounds: Normal heart sounds. No murmur. No friction rub. No gallop. Pulmonary:      Effort: Pulmonary effort is normal. No respiratory distress. Breath sounds: Normal breath sounds. No stridor. No wheezing, rhonchi or rales. Chest:      Chest wall: No tenderness. Breasts:         Right: No inverted nipple, mass, nipple discharge, skin change or tenderness. Left: No inverted nipple, mass, nipple discharge, skin change or tenderness. Abdominal:      General: Bowel sounds are normal. There is no distension. Palpations: Abdomen is soft. There is no mass. Tenderness: There is no abdominal tenderness. There is no guarding or rebound. Genitourinary:     Labia:         Right: No rash, tenderness, lesion or injury. Left: No rash, tenderness, lesion or injury. Vagina: Normal. No signs of injury and foreign body. No vaginal discharge, erythema, tenderness or bleeding. Cervix: No cervical motion tenderness, discharge or friability. Uterus: Not enlarged and not tender. Adnexa:         Right: No mass, tenderness or fullness. Left: No mass, tenderness or fullness. Comments: External genitalia are normal in appearance. Examination of urethral meatus reveals location normal and size normal.   Examination of urethra shows no abnormalities. Examination of vaginal vault reveals no abnormalities. Cervix is long and closed without visible pathology. Pap smear collected. Uterine portion of bimanual exam reveals contour normal, shape regular, descensus absent, no nodularity, no tenderness and size 6 weeks GA. Adnexa and parametria exam reveals no masses, nontender. Visual examination of anus and perineum shows no abnormalities. Musculoskeletal: Normal range of motion. General: No tenderness. Lymphadenopathy:      Cervical: No cervical adenopathy. Upper Body:      Right upper body: No supraclavicular adenopathy. Left upper body: No supraclavicular adenopathy. Lower Body: No right inguinal adenopathy. No left inguinal adenopathy. Skin:     General: Skin is warm and dry. Coloration: Skin is not pale. Findings: No erythema or rash. Neurological:      Mental Status: She is alert and oriented to person, place, and time. Cranial Nerves: No cranial nerve deficit. Motor: No abnormal muscle tone. Coordination: Coordination normal.   Psychiatric:         Behavior: Behavior normal.         Thought Content:  Thought content normal. Judgment: Judgment normal.         1. Well woman exam with routine gynecological exam    2. Screening for cervical cancer    3. Screening for human papillomavirus (HPV)    4. Encounter for screening mammogram for malignant neoplasm of breast      Orders Placed This Encounter   Procedures    [facility] OSCAR DIGITAL SCREEN BILATERAL    PAP IG, Aptima HPV and rfx 16/18,45 ([id number])     No orders of the defined types were placed in this encounter. Routine age-appropriate well woman counseling was performed. Pt's mother has recurrent endometrial cancer and pt's aunt has cancer with ovarian metastasis - recommended pt see genetics, she confirms she has already set this up.       Return in about 1 year (around 1/12/2024), or if symptoms worsen or fail to improve, for Annual exam.

## 2023-01-17 LAB
CYTOLOGIST CVX/VAG CYTO: NORMAL
CYTOLOGY CVX/VAG DOC THIN PREP: NORMAL
HPV APTIMA: NEGATIVE
HPV GENOTYPE REFLEX: NORMAL
Lab: NORMAL
PATH REPORT.FINAL DX SPEC: NORMAL
STAT OF ADQ CVX/VAG CYTO-IMP: NORMAL

## 2023-09-06 ENCOUNTER — OFFICE VISIT (OUTPATIENT)
Dept: ENDOCRINOLOGY | Age: 38
End: 2023-09-06

## 2023-09-06 VITALS
BODY MASS INDEX: 24.94 KG/M2 | WEIGHT: 164 LBS | HEART RATE: 76 BPM | SYSTOLIC BLOOD PRESSURE: 115 MMHG | OXYGEN SATURATION: 100 % | DIASTOLIC BLOOD PRESSURE: 75 MMHG

## 2023-09-06 DIAGNOSIS — E04.2 MULTIPLE THYROID NODULES: Primary | ICD-10-CM

## 2023-09-06 ASSESSMENT — ENCOUNTER SYMPTOMS
TROUBLE SWALLOWING: 0
VOICE CHANGE: 0

## 2023-09-06 NOTE — PROGRESS NOTES
Christophe Boogie MD, [facility]            Reason for visit: Follow-up of thyroid nodules      ASSESSMENT AND PLAN:    1. Multiple thyroid nodules  Prior biopsy of the dominant nodule at the right lower pole in 2019 was fortunately benign. Ultrasound was repeated today (see below) and is not significantly changed. Repeat in 2 years.  - US,HEAD/NECK TISSUES,REAL TIME      PROCEDURES:    HEAD AND NECK ULTRASOUND    Date of study:  2023    Performing/interpreting physician:  Christophe Boogie MD, FACE    Indication:  thyroid nodule    Technique:  Using a 12 MHz linear transducer, multiple real-time planar images were obtained of the thyroid and surrounding tissues. Findings:  Right lobe 1.59 x 1.60 x 4.36 cm, isthmus 0.19 cm, left lobe 1.11 x 1.53 x 4.36 cm. Homogeneous echotexture. Normal blood flow. 1.29 x 1.42 x 1.77 cm isoechoic nodule with increased peripheral but not central blood flow and no calcifications in the mid to lower portion of the right lobe (TR 3). 0.65 x 0.76 x 0.88 cm hyperechoic nodule without calcifications or increased blood flow at the left lower pole (TR 3). Impression: Multiple thyroid nodules as noted. Follow-up and Dispositions    Return in about 2 years (around 2025). History of Present Illness:    THYROID NODULES  Chon Govea is seen today in the [facility] for follow-up of thyroid nodules, incidentally noted on physical examination in 2019 by Dr. Rolando Parker. Symptoms: See review of systems below        Risk factors for malignancy: There is not a history of radiation to the head/neck. The patient does not have a family history of thyroid cancer. Prior imagin2019: Ultrasound (Robinson Rands)- Right lobe 4.3 x 1.4 x 1.8 cm, isthmus 0.3 cm, left lobe 4.0 x 1.4 x 1.3 cm. 1.1 x 1.8 x 1.5 cm hyperechoic nodule in the right lobe.   0.7 x 0.9 x 0.5 cm

## 2024-01-15 SDOH — ECONOMIC STABILITY: FOOD INSECURITY: WITHIN THE PAST 12 MONTHS, YOU WORRIED THAT YOUR FOOD WOULD RUN OUT BEFORE YOU GOT MONEY TO BUY MORE.: NEVER TRUE

## 2024-01-15 SDOH — ECONOMIC STABILITY: FOOD INSECURITY: WITHIN THE PAST 12 MONTHS, THE FOOD YOU BOUGHT JUST DIDN'T LAST AND YOU DIDN'T HAVE MONEY TO GET MORE.: NEVER TRUE

## 2024-01-15 SDOH — ECONOMIC STABILITY: INCOME INSECURITY: HOW HARD IS IT FOR YOU TO PAY FOR THE VERY BASICS LIKE FOOD, HOUSING, MEDICAL CARE, AND HEATING?: NOT HARD AT ALL

## 2024-01-15 SDOH — ECONOMIC STABILITY: HOUSING INSECURITY
IN THE LAST 12 MONTHS, WAS THERE A TIME WHEN YOU DID NOT HAVE A STEADY PLACE TO SLEEP OR SLEPT IN A SHELTER (INCLUDING NOW)?: NO

## 2024-01-15 SDOH — ECONOMIC STABILITY: TRANSPORTATION INSECURITY
IN THE PAST 12 MONTHS, HAS LACK OF TRANSPORTATION KEPT YOU FROM MEETINGS, WORK, OR FROM GETTING THINGS NEEDED FOR DAILY LIVING?: NO

## 2024-01-17 ASSESSMENT — ENCOUNTER SYMPTOMS
EYES NEGATIVE: 1
ALLERGIC/IMMUNOLOGIC NEGATIVE: 1
GASTROINTESTINAL NEGATIVE: 1
RESPIRATORY NEGATIVE: 1

## 2024-01-17 NOTE — PROGRESS NOTES
Name: Sherrill Tapia  Date: 2024  YOB: 1985  LMP: Patient's last menstrual period was 2024.      Sherrill is a 38 y.o.   who is here for a annual exam.     Birth control: condoms  Menstrual status: regular cycles  Gyn Surgery:  D&C    Health Maintenance:  Pap Smear: last pap in  23 wnl/neg hpv  HPV vaccine: not done  If 40 or older, Mammo: last mammo in 20, pathology Negative  If 45 or older, Colonoscopy:  never      Review of Systems   Constitutional: Negative.    HENT: Negative.     Eyes: Negative.    Respiratory: Negative.     Cardiovascular: Negative.    Gastrointestinal: Negative.    Endocrine: Negative.    Genitourinary: Negative.    Musculoskeletal: Negative.    Skin: Negative.    Allergic/Immunologic: Negative.    Hematological: Negative.    Psychiatric/Behavioral: Negative.  Negative for self-injury and suicidal ideas.         Past Medical History:  No date: Hemorrhoid  No date: Multiple thyroid nodules  No date: Postpartum depression     Past Surgical History:  2012: BREAST BIOPSY; Left      Comment:  needle biopsy  2011: BREAST BIOPSY; Left      Comment:  papilloma - 3:00; Dr. Smith  05/10/2018: DILATION AND CURETTAGE OF UTERUS      Comment:  w/ suction  No date: HEMORRHOID SURGERY      Comment:  excised  2008: KNEE ARTHROSCOPY; Left  2012: SKIN BIOPSY; Left      Comment:  removal of dermatofibroma left inner thigh  No date: WISDOM TOOTH EXTRACTION       Current Outpatient Medications:     Multiple Vitamin (MULTIVITAMIN ADULT PO), Take by mouth, Disp: , Rfl:     Family Cancer History:   Cancer-related family history includes Cancer in her sister; Endometrial Cancer in her mother; Lung Cancer (age of onset: 80) in her maternal grandmother; Ovarian Cancer in her maternal aunt; Uterine Cancer in her maternal aunt and mother. There is no history of Breast Cancer or Thyroid Cancer.     Social History:  reports that she has never smoked. She has never used

## 2024-01-18 ENCOUNTER — OFFICE VISIT (OUTPATIENT)
Dept: OBGYN CLINIC | Age: 39
End: 2024-01-18

## 2024-01-18 VITALS
BODY MASS INDEX: 24.86 KG/M2 | WEIGHT: 164 LBS | SYSTOLIC BLOOD PRESSURE: 118 MMHG | HEIGHT: 68 IN | DIASTOLIC BLOOD PRESSURE: 70 MMHG

## 2024-01-18 DIAGNOSIS — Z12.4 SCREENING FOR CERVICAL CANCER: ICD-10-CM

## 2024-01-18 DIAGNOSIS — Z11.51 SCREENING FOR HUMAN PAPILLOMAVIRUS (HPV): ICD-10-CM

## 2024-01-18 DIAGNOSIS — Z12.31 OTHER SCREENING MAMMOGRAM: ICD-10-CM

## 2024-01-18 DIAGNOSIS — Z01.419 WELL WOMAN EXAM WITH ROUTINE GYNECOLOGICAL EXAM: Primary | ICD-10-CM

## 2024-01-18 PROCEDURE — 99396 PREV VISIT EST AGE 40-64: CPT | Performed by: OBSTETRICS & GYNECOLOGY

## 2024-01-23 LAB
COLLECTION METHOD: NORMAL
CYTOLOGIST CVX/VAG CYTO: NORMAL
CYTOLOGY CVX/VAG DOC THIN PREP: NORMAL
DATE OF LMP: NORMAL
HPV APTIMA: NEGATIVE
HPV GENOTYPE REFLEX: NORMAL
Lab: NORMAL
OTHER PT INFO: NORMAL
PAP SOURCE: NORMAL
PATH REPORT.FINAL DX SPEC: NORMAL
PREV CYTO INFO: NEGATIVE
PREV TREATMENT RESULTS: NORMAL
PREV TREATMENT: NORMAL
STAT OF ADQ CVX/VAG CYTO-IMP: NORMAL

## 2024-04-25 ENCOUNTER — OFFICE VISIT (OUTPATIENT)
Dept: OBGYN CLINIC | Age: 39
End: 2024-04-25

## 2024-04-25 VITALS
SYSTOLIC BLOOD PRESSURE: 120 MMHG | DIASTOLIC BLOOD PRESSURE: 68 MMHG | WEIGHT: 164 LBS | BODY MASS INDEX: 24.86 KG/M2 | HEIGHT: 68 IN

## 2024-04-25 DIAGNOSIS — N93.9 ABNORMAL UTERINE BLEEDING: Primary | ICD-10-CM

## 2024-04-25 ASSESSMENT — ENCOUNTER SYMPTOMS
RESPIRATORY NEGATIVE: 1
ALLERGIC/IMMUNOLOGIC NEGATIVE: 1
EYES NEGATIVE: 1
GASTROINTESTINAL NEGATIVE: 1

## 2024-04-25 NOTE — PROGRESS NOTES
Name: Sherrill Tapia  Date: 2024  YOB: 1985  LMP: No LMP recorded.      Sherrill is a 38 y.o.   who is here for a problem visit for AUB . This is the first month she has had spotting between her periods.  She is worried as both her mother and grandmother have had endometrial cancer.     Birth control: condoms  Menstrual status: regular cycles  Gyn Surgery:  D&C     Health Maintenance:  Pap Smear: last pap in  23 wnl/neg hpv  HPV vaccine: not done  If 40 or older, Mammo: last mammo in 20, pathology Negative  If 45 or older, Colonoscopy:  never    Review of Systems   Constitutional: Negative.    HENT: Negative.     Eyes: Negative.    Respiratory: Negative.     Cardiovascular: Negative.    Gastrointestinal: Negative.    Endocrine: Negative.    Genitourinary: Negative.    Musculoskeletal: Negative.    Skin: Negative.    Allergic/Immunologic: Negative.    Hematological: Negative.    Psychiatric/Behavioral: Negative.  Negative for self-injury and suicidal ideas.         Past Medical History:  No date: Hemorrhoid  No date: Multiple thyroid nodules  No date: Postpartum depression     Past Surgical History:  2012: BREAST BIOPSY; Left      Comment:  needle biopsy  2011: BREAST BIOPSY; Left      Comment:  papilloma - 3:00; Dr. Smith  05/10/2018: DILATION AND CURETTAGE OF UTERUS      Comment:  w/ suction  No date: HEMORRHOID SURGERY      Comment:  excised  2008: KNEE ARTHROSCOPY; Left  2012: SKIN BIOPSY; Left      Comment:  removal of dermatofibroma left inner thigh  No date: WISDOM TOOTH EXTRACTION       Current Outpatient Medications:     Multiple Vitamin (MULTIVITAMIN ADULT PO), Take by mouth, Disp: , Rfl:     Family Cancer History:   Cancer-related family history includes Cancer in her sister; Endometrial Cancer in her mother; Lung Cancer (age of onset: 80) in her maternal grandmother; Ovarian Cancer in her maternal aunt; Uterine Cancer in her maternal aunt and mother. There is no

## (undated) DEVICE — GOWN,REINF,POLY,ECL,PP SLV,XL: Brand: MEDLINE

## (undated) DEVICE — PVC URETHRAL CATHETER: Brand: DOVER

## (undated) DEVICE — CYSTO: Brand: MEDLINE INDUSTRIES, INC.

## (undated) DEVICE — SOLUTION IV 1000ML 0.9% SOD CHL

## (undated) DEVICE — U.V.A.C. SWIVEL HANDLE W/TUBING, 6': Brand: CONVERTORS

## (undated) DEVICE — Z DISCONTINUED USE 2635398 CANNULA VAC DIA9MM CRV SEMI RIG W/ ROUNDED TIP TAPR END

## (undated) DEVICE — DRAPE TWL SURG 16X26IN BLU ORB04] ALLCARE INC]

## (undated) DEVICE — DRAPE,UNDERBUTTOCKS,PCH,STERILE: Brand: MEDLINE

## (undated) DEVICE — CARDINAL HEALTH FLEXIBLE LIGHT HANDLE COVER: Brand: CARDINAL HEALTH

## (undated) DEVICE — CONTAINER SPEC HISTOLOGY 900ML POLYPR

## (undated) DEVICE — TRAY PREP DRY W/ PREM GLV 2 APPL 6 SPNG 2 UNDPD 1 OVERWRAP

## (undated) DEVICE — REM POLYHESIVE ADULT PATIENT RETURN ELECTRODE: Brand: VALLEYLAB

## (undated) DEVICE — PERI-PAD,MODERATE: Brand: CURITY